# Patient Record
Sex: MALE | Race: WHITE | NOT HISPANIC OR LATINO | Employment: FULL TIME | URBAN - METROPOLITAN AREA
[De-identification: names, ages, dates, MRNs, and addresses within clinical notes are randomized per-mention and may not be internally consistent; named-entity substitution may affect disease eponyms.]

---

## 2018-08-02 ENCOUNTER — OFFICE VISIT (OUTPATIENT)
Dept: INTERNAL MEDICINE | Facility: CLINIC | Age: 53
End: 2018-08-02
Payer: COMMERCIAL

## 2018-08-02 VITALS
SYSTOLIC BLOOD PRESSURE: 122 MMHG | HEIGHT: 70 IN | HEART RATE: 80 BPM | WEIGHT: 227 LBS | OXYGEN SATURATION: 97 % | BODY MASS INDEX: 32.5 KG/M2 | TEMPERATURE: 99 F | DIASTOLIC BLOOD PRESSURE: 70 MMHG | RESPIRATION RATE: 16 BRPM

## 2018-08-02 DIAGNOSIS — Z12.5 SCREENING FOR PROSTATE CANCER: ICD-10-CM

## 2018-08-02 DIAGNOSIS — Z13.1 SCREENING FOR DIABETES MELLITUS: ICD-10-CM

## 2018-08-02 DIAGNOSIS — Z13.6 CARDIOVASCULAR SCREENING; LDL GOAL LESS THAN 130: ICD-10-CM

## 2018-08-02 DIAGNOSIS — Z11.59 NEED FOR HEPATITIS C SCREENING TEST: ICD-10-CM

## 2018-08-02 DIAGNOSIS — L03.114 CELLULITIS OF LEFT UPPER EXTREMITY: Primary | ICD-10-CM

## 2018-08-02 DIAGNOSIS — Z11.4 SCREENING FOR HIV (HUMAN IMMUNODEFICIENCY VIRUS): ICD-10-CM

## 2018-08-02 PROCEDURE — 99213 OFFICE O/P EST LOW 20 MIN: CPT | Performed by: INTERNAL MEDICINE

## 2018-08-02 RX ORDER — CEPHALEXIN 500 MG/1
CAPSULE ORAL
Refills: 0 | COMMUNITY
Start: 2018-07-31 | End: 2020-08-24

## 2018-08-02 NOTE — PROGRESS NOTES
"  SUBJECTIVE:   Hill Lentz is a 53 year old male who presents to clinic today for the following health issues:    Chief Complaint   Patient presents with     Follow Up For     Urgent Care Visit       ED/UC Followup:    Facility:  [facility]  Date of visit: 07/31/2018  Reason for visit: Cellulitis  Current Status: Stable, feeling better       Pt's past medical history, family history, habits, medications and allergies were reviewed with the patient today.  See snap shot for  HCM status. Most recent lab results reviewed with pt. Problem list and histories reviewed & adjusted, as indicated.  Additional history as below:    Seen in outside UC 7/31/18. Note reviewed. Part of note as below:    Physical Exam     BP (!) 137/96 (BP Location: Left Arm, BP Cuff Size: Adult Regular/Long)  Pulse 86  Temp 37.2  C (98.9  F) (Oral)  Resp 16  SpO2 99%    Patient awake alert no acute distress well-nourished well-developed   HEENT: head symmetric nontraumatic eyes PERRLA   Lungs: Clear bilateral no rales or wheezing  Heart: RRR with no murmurs  Skin: erythema measuring 3 cm by 3 cm with induration left upper arm.     No results found for this visit on 07/31/18.    ED Course     Assesment:   1. Cellulitis of left upper extremity     Plan:  Ice and elevate area  Keflex prescribed.  Return to clinic or ER if increased pain, erythema/redness, swelling or fever  follow up with PCP for recheck in 3-5 days.        Back to top of Miscellaneous Notes  Triage Assessment Note - Elenita Pathak LPN - 07/31/2018 1:25 PM CDT  Pt here with possible bug bite under his left arm. He noticed it yesterday morning. It is \"sore\", red, and swollen. Denies itching. He has had a fever, feeling tired, and has a headache.        Was started on Keflex QID for 10 days. BP elevated in UC. Normal today. . Pt states redness area less and less firm.  Pt states that wife sprayed bathroom crawlspace area for spiders the day before onset and " "wonders if stirred up a nest as bedroom right next to bathroom. Was fine the night before onset but doesn't remember particular bite  Had previous chills that has resolved. Minimal fever on vitals today. No headache now after previous achiness.  Weight up 11 pounds in 2 years. Pt states sonjacquie goes to college in 2 weeks and will then be starting new diet with wife      Additional ROS:   Constitutional, HEENT, Cardiovascular, Pulmonary, GI and , Neuro, MSK and Psych review of systems/symptoms are otherwise negative or unchanged from previous, except as noted above.      OBJECTIVE:  /70  Pulse 80  Temp 99  F (37.2  C) (Oral)  Resp 16  Ht 5' 10\" (1.778 m)  Wt 227 lb (103 kg)  SpO2 97%  BMI 32.57 kg/m2   Estimated body mass index is 32.57 kg/(m^2) as calculated from the following:    Height as of this encounter: 5' 10\" (1.778 m).    Weight as of this encounter: 227 lb (103 kg).    Neck: no adenopathy. Thyroid normal to palpation. No bruits  Pulm: Lungs clear to auscultation   CV: Regular rates and rhythm  GI: Soft, obese, nontender, Normal active bowel sounds, No hepatosplenomegaly or masses palpable  Ext: Peripheral pulses intact. No edema.  Neuro: Normal strength and tone, sensory exam grossly normal  Skin: 11x5cm erythema left inner upper extremity near axilla with center red spot 1mm c/w site of previous ? Bite. Mild firmness to cellulitis tissue       Assessment/Plan: (See plan discussion below for further details)  1. Cellulitis of left upper extremity  Improving. Pt will finish 10 day course of Keflex. Will inform MD if not resolved by 1o days or worsens previous    2. Need for hepatitis C screening test  Pt meets criteria for hepatitis C screening based on birth year 1946-65. Discussed pro/cons of Hep C screening/treatment and recs of USPTF. Pt agreeable to screening  - Hepatitis C Screen Reflex to HCV RNA Quant and Genotype; Future    3. Screening for HIV (human immunodeficiency virus)   HIV " screening recommendation per USPSTF guideline discussed with pt. Pt agrees.   - HIV Screening; Future    4. Screening for prostate cancer   PSA ordered for intermittent monitoring after discussion with pt and pt preference. Not doing annual PSA per USPTF recs  - Prostate spec antigen screen; Future    5. Screening for diabetes mellitus  - Comprehensive metabolic panel; Future    6. CARDIOVASCULAR SCREENING; LDL GOAL LESS THAN 130  - Lipid panel reflex to direct LDL Fasting; Future    Plan discussion:  Finish 10 day course of Cephalexin and call clinic if not resolved  By then or worsens prior  Call  [phone]  to schedule a future lab appointment  fasting in  September  Follow up with me a few days after these future labs are drawn to review results and for annual preventative physical exam  Calorie/carbohydrate (sugar, bread, potato, pasta, etc) reduction in diet for weight loss.  Increase color on your plate with fruits and vegetables. Increase  frequency of walking or other aerobic exercise as able (goal is daily)              Valentin Pina MD  Internal Medicine Department  [facility]

## 2018-08-02 NOTE — MR AVS SNAPSHOT
After Visit Summary   8/2/2018    Hill Lentz    MRN: [id number]           Patient Information     Date Of Birth          1965        Visit Information        Provider Department      8/2/2018 8:00 AM Valentin Pina MD [facility]        Today's Diagnoses     Cellulitis of left upper extremity    -  1    Need for hepatitis C screening test        Screening for HIV (human immunodeficiency virus)        Screening for prostate cancer        Screening for diabetes mellitus        CARDIOVASCULAR SCREENING; LDL GOAL LESS THAN 130          Care Instructions    Finish 10 day course of Cephalexin and call clinic if not resolved  By then or worsens prior  Call  [phone]  to schedule a future lab appointment  fasting in  September  Follow up with me a few days after these future labs are drawn to review results and for annual preventative physical exam  Calorie/carbohydrate (sugar, bread, potato, pasta, etc) reduction in diet for weight loss.  Increase color on your plate with fruits and vegetables. Increase  frequency of walking or other aerobic exercise as able (goal is daily)          Follow-ups after your visit        Future tests that were ordered for you today     Open Future Orders        Priority Expected Expires Ordered    Comprehensive metabolic panel Routine 9/1/2018 8/2/2019 8/2/2018    Lipid panel reflex to direct LDL Fasting Routine 9/1/2018 8/2/2019 8/2/2018    Hepatitis C Screen Reflex to HCV RNA Quant and Genotype Routine 9/1/2018 8/2/2019 8/2/2018    HIV Screening Routine 9/1/2018 8/2/2019 8/2/2018    Prostate spec antigen screen Routine 9/1/2018 8/2/2019 8/2/2018            Who to contact     If you have questions or need follow up information about today's clinic visit or your schedule please contact [facility] directly at [phone].  Normal or non-critical lab and imaging results will be communicated to you by MyChart, letter or  "phone within 4 business days after the clinic has received the results. If you do not hear from us within 7 days, please contact the clinic through [employer] or phone. If you have a critical or abnormal lab result, we will notify you by phone as soon as possible.  Submit refill requests through [employer] or call your pharmacy and they will forward the refill request to us. Please allow 3 business days for your refill to be completed.          Additional Information About Your Visit        [employer] Information     [employer] lets you send messages to your doctor, view your test results, renew your prescriptions, schedule appointments and more. To sign up, go to [email or web address] . Click on \"Log in\" on the left side of the screen, which will take you to the Welcome page. Then click on \"Sign up Now\" on the right side of the page.     You will be asked to enter the access code listed below, as well as some personal information. Please follow the directions to create your username and password.     Your access code is: [email or web address]  Expires: 10/31/2018  8:36 AM     Your access code will  in 90 days. If you need help or a new code, please call your [place] clinic or [phone].        Care EveryWhere ID     This is your Care EveryWhere ID. This could be used by other organizations to access your [place] medical records  [id number]        Your Vitals Were     Pulse Temperature Respirations Height Pulse Oximetry BMI (Body Mass Index)    80 99  F (37.2  C) (Oral) 16 5' 10\" (1.778 m) 97% 32.57 kg/m2       Blood Pressure from Last 3 Encounters:   18 122/70   16 124/82   16 115/82    Weight from Last 3 Encounters:   18 227 lb (103 kg)   16 221 lb (100.2 kg)   16 216 lb (98 kg)               Primary Care Provider Office Phone # Fax #    Valentin Pina [phone]       [address] [facility] [address]        Equal Access to Services     RENU LATHAM AH: Lydia zhao " demetris Lovelace, jack luanaadaha, qabrianneta kamdaison ramirez, sheldon vigil shalinilili melomargy lakathylavinia melinda. So [facility] [phone].    ATENCIÓN: Si habla español, tiene a mar disposición servicios gratuitos de asistencia lingüística. Ramses al [phone].    We comply with applicable federal civil rights laws and [place] laws. We do not discriminate on the basis of race, color, national origin, age, disability, sex, sexual orientation, or gender identity.            Thank you!     Thank you for choosing [facility]  for your care. Our goal is always to provide you with excellent care. Hearing back from our patients is one way we can continue to improve our services. Please take a few minutes to complete the written survey that you may receive in the mail after your visit with us. Thank you!             Your Updated Medication List - Protect others around you: Learn how to safely use, store and throw away your medicines at www.disposemymeds.org.          This list is accurate as of 8/2/18  8:36 AM.  Always use your most recent med list.                   Brand Name Dispense Instructions for use Diagnosis    cephALEXin 500 MG capsule    KEFLEX          fluticasone 50 MCG/ACT spray    FLONASE    16 g    Spray 1-2 sprays into both nostrils daily    Right otitis media, unspecified otitis media type

## 2018-08-02 NOTE — PATIENT INSTRUCTIONS
Finish 10 day course of Cephalexin and call clinic if not resolved  By then or worsens prior  Call  [phone]  to schedule a future lab appointment  fasting in  September  Follow up with me a few days after these future labs are drawn to review results and for annual preventative physical exam  Calorie/carbohydrate (sugar, bread, potato, pasta, etc) reduction in diet for weight loss.  Increase color on your plate with fruits and vegetables. Increase  frequency of walking or other aerobic exercise as able (goal is daily)

## 2020-08-24 ENCOUNTER — OFFICE VISIT (OUTPATIENT)
Dept: INTERNAL MEDICINE | Facility: CLINIC | Age: 55
End: 2020-08-24
Payer: COMMERCIAL

## 2020-08-24 VITALS
DIASTOLIC BLOOD PRESSURE: 80 MMHG | OXYGEN SATURATION: 96 % | HEIGHT: 67 IN | HEART RATE: 81 BPM | BODY MASS INDEX: 36.41 KG/M2 | WEIGHT: 232 LBS | SYSTOLIC BLOOD PRESSURE: 120 MMHG | TEMPERATURE: 98.2 F | RESPIRATION RATE: 16 BRPM

## 2020-08-24 DIAGNOSIS — Z72.0 TOBACCO DIPPER: ICD-10-CM

## 2020-08-24 DIAGNOSIS — Z86.0100 HISTORY OF COLONIC POLYPS: ICD-10-CM

## 2020-08-24 DIAGNOSIS — Z00.01 ENCOUNTER FOR ROUTINE ADULT MEDICAL EXAM WITH ABNORMAL FINDINGS: Primary | ICD-10-CM

## 2020-08-24 DIAGNOSIS — E66.01 CLASS 2 SEVERE OBESITY WITH SERIOUS COMORBIDITY AND BODY MASS INDEX (BMI) OF 36.0 TO 36.9 IN ADULT, UNSPECIFIED OBESITY TYPE (H): ICD-10-CM

## 2020-08-24 DIAGNOSIS — E66.812 CLASS 2 SEVERE OBESITY WITH SERIOUS COMORBIDITY AND BODY MASS INDEX (BMI) OF 36.0 TO 36.9 IN ADULT, UNSPECIFIED OBESITY TYPE (H): ICD-10-CM

## 2020-08-24 PROCEDURE — 99396 PREV VISIT EST AGE 40-64: CPT | Performed by: INTERNAL MEDICINE

## 2020-08-24 ASSESSMENT — MIFFLIN-ST. JEOR: SCORE: 1833.04

## 2020-08-24 NOTE — PATIENT INSTRUCTIONS
Call  [phone] or use [employer] to schedule a future lab appointment  fasting in 1-2 weeks.   For fasting labs, please refrain from eating for 8 hours or more.   Drink 2 glasses of water before your lab appointment. It is fine to take your  oral medications on the morning of the lab test as usual  Reduce calorie/carbohydrate (sugar, bread, potato, pasta, rice, alcohol etc)  intake in diet.  Increase color on your plate with fruits and vegetables. Continue  frequency of walking or other aerobic exercise as able (goal is daily)  Check with insurance or speak with your pharmacist re: Shingrix vaccine coverage for shingles prevention.  This is a 2 shot series done 2-6 months apart  I would recommend you receive an influenza (flu) vaccine  this Fall (September or October)   Discontinue chewing tobacco use. Possible nicotine lozenge substitute shortterm

## 2020-08-24 NOTE — PROGRESS NOTES
SUBJECTIVE:   CC: Hill Lentz is an 55 year old male who presents for preventative health visit.     Healthy Habits:     Getting at least 3 servings of Calcium per day:  Yes    Bi-annual eye exam:  Yes    Dental care twice a year:  Yes    Sleep apnea or symptoms of sleep apnea:  Sleep apnea    Diet:  Regular (no restrictions)    Frequency of exercise:  4-5 days/week    Duration of exercise:  45-60 minutes    Taking medications regularly:  Not Applicable    Barriers to taking medications:  Not applicable    Medication side effects:  Not applicable    PHQ-2 Total Score: 0    Additional concerns today:  No      Today's PHQ-2 Score:   PHQ-2 ( 1999 Pfizer) 8/2/2018   Q1: Little interest or pleasure in doing things 0   Q2: Feeling down, depressed or hopeless 0   PHQ-2 Score 0       Abuse: Current or Past(Physical, Sexual or Emotional)- No  Do you feel safe in your environment? Yes      Social History     Tobacco Use     Smoking status: Former Smoker     Packs/day: 0.25     Years: 20.00     Pack years: 5.00     Types: Dip, chew, snus or snuff     Smokeless tobacco: Current User     Types: Chew     Tobacco comment: 1 tin every 2 weeks   Substance Use Topics     Alcohol use: Yes     Alcohol/week: 0.0 standard drinks     Comment: 1-2 drinks twice weekly     If you drink alcohol do you typically have >3 drinks per day or >7 drinks per week? No    Alcohol Use 11/3/2015   Prescreen: >3 drinks/day or >7 drinks/week? The patient does not drink >3 drinks per day nor >7 drinks per week.       Last PSA: No results found for: PSA    Reviewed orders with patient. Reviewed health maintenance and updated orders accordingly - Yes      Reviewed and updated as needed this visit by clinical staff         Reviewed and updated as needed this visit by Provider            Review of Systems  CONSTITUTIONAL: NEGATIVE for fever, chills. Weight up 11 pounds in 4 years  INTEGUMENTARY/SKIN: NEGATIVE for worrisome rashes, moles or lesions  EYES:  "NEGATIVE for vision changes or irritation  ENT: NEGATIVE for ear, mouth and throat problems. Chewing tobacco and reducing use  RESP: NEGATIVE for significant cough or SOB. Using CPAP for BRANDON  CV: NEGATIVE for chest pain, palpitations or peripheral edema  GI: NEGATIVE for nausea, abdominal pain, heartburn, or change in bowel habits (unelss drinks a lot of milk). No sx with mild intake    male: negative for dysuria, hematuria, decreased urinary stream, erectile dysfunction, urethral discharge  MUSCULOSKELETAL: NEGATIVE for significant arthralgias or myalgia  NEURO: NEGATIVE for weakness, dizziness or paresthesias  PSYCHIATRIC: NEGATIVE for changes in mood or affect    OBJECTIVE:   /80   Pulse 81   Temp 98.2  F (36.8  C) (Temporal)   Resp 16   Ht 1.689 m (5' 6.5\")   Wt 105.2 kg (232 lb)   SpO2 96%   BMI 36.88 kg/m      Physical Exam  General appearance -  alert, no distress  Skin - No rashes or lesions. Mld callus medial 1st MTP area of feet bilaterally  Head - normocephalic, atraumatic  Eyes - JENNI, EOMI, fundi exam with nondilated pupils negative.  Ears - External ears normal. Canals clear. TM's normal.  Nose/Sinuses - Nares normal. Septum midline. Mucosa normal. No drainage or sinus tenderness.  Oropharynx - No erythema, no adenopathy, no exudates. NO lesion noted. Narnow airway due to obesuity  Neck - Supple without adenopathy or thyromegaly. No bruits.  Lungs - Clear to auscultation without wheezes/rhonchi.  Heart - Regular rate and rhythm without murmurs, clicks, or gallops.  Nodes - No supraclavicular, axillary, or inguinal adenopathy palpable.  Abdomen - Abdomen obese, soft, non-tender. BS normal. No masses or hepatosplenomegaly palpable. No bruits.  Extremities -No cyanosis, clubbing or edema.    Musculoskeletal - Spine ROM normal. Muscular strength intact.   Peripheral pulses - radial=4/4, femoral=4/4, posterior tibial=4/4, dorsalis pedis=4/4,  Neuro - Gait normal. Reflexes normal and " "symmetric. Sensation grossly WNL.  Genital - Normal-appearing male external genitalia. No scrotal masses or inguinal hernia palpable.   Rectal - Guaic negative stool. Normal tone. Prostate normal in size to palpation. No rectal masses or prostate nodularity palpable    ASSESSMENT/PLAN:   1. Encounter for routine adult medical exam with abnormal findings   See below for healthcare maintenance discussion.  Will have screening labs done in the next week as previously ordered    2. Class 2 severe obesity with serious comorbidity and body mass index (BMI) of 36.0 to 36.9 in adult, unspecified obesity type (H)  Weight up with increased caloric intake. Exercising. See counseling below    3.   Counseled re: oral cancer risks, CAD risk, etc. Pt has been cutting back but not fully motivated to quit at this time. Strongly encounraged cessation. May consider nicotine supplement prn. Possible  Future Chantix    4. History of colonic polyps   Due for  Colonoscopy in early 2011      COUNSELING:   Reviewed preventive health counseling, as reflected in patient instructions    Estimated body mass index is 36.88 kg/m  as calculated from the following:    Height as of this encounter: 1.689 m (5' 6.5\").    Weight as of this encounter: 105.2 kg (232 lb).     Weight management plan: Discussed healthy diet and exercise guidelines     reports that he has quit smoking. His smoking use included dip, chew, snus or snuff. He has a 5.00 pack-year smoking history. His smokeless tobacco use includes chew.  Tobacco Cessation Action Plan: Phone counseling: Place order for Tobacco Cessation Referral    Counseling Resources:  ATP IV Guidelines  Pooled Cohorts Equation Calculator  FRAX Risk Assessment  ICSI Preventive Guidelines  Dietary Guidelines for Americans, 2010  USDA's MyPlate  ASA Prophylaxis  Lung CA Screening      PLAN:  Call  [phone] or use [employer] to schedule a future lab appointment  fasting in 1-2 weeks.   For fasting " labs, please refrain from eating for 8 hours or more.   Drink 2 glasses of water before your lab appointment. It is fine to take your  oral medications on the morning of the lab test as usual  Reduce calorie/carbohydrate (sugar, bread, potato, pasta, rice, alcohol etc)  intake in diet.  Increase color on your plate with fruits and vegetables. Continue  frequency of walking or other aerobic exercise as able (goal is daily)  Check with insurance or speak with your pharmacist re: Shingrix vaccine coverage for shingles prevention.  This is a 2 shot series done 2-6 months apart  I would recommend you receive an influenza (flu) vaccine  this Fall (September or October)   Discontinue chewing tobacco use. Possible nicotine lozenge substitute shortterm    Valentin Pina MD  [facility]

## 2020-09-02 DIAGNOSIS — Z13.6 CARDIOVASCULAR SCREENING; LDL GOAL LESS THAN 130: ICD-10-CM

## 2020-09-02 DIAGNOSIS — Z11.59 NEED FOR HEPATITIS C SCREENING TEST: ICD-10-CM

## 2020-09-02 DIAGNOSIS — Z13.1 SCREENING FOR DIABETES MELLITUS: ICD-10-CM

## 2020-09-02 DIAGNOSIS — Z12.5 SCREENING FOR PROSTATE CANCER: ICD-10-CM

## 2020-09-02 LAB
ALBUMIN SERPL-MCNC: 3.4 G/DL (ref 3.4–5)
ALP SERPL-CCNC: 109 U/L (ref 40–150)
ALT SERPL W P-5'-P-CCNC: 37 U/L (ref 0–70)
ANION GAP SERPL CALCULATED.3IONS-SCNC: 5 MMOL/L (ref 3–14)
AST SERPL W P-5'-P-CCNC: 16 U/L (ref 0–45)
BILIRUB SERPL-MCNC: 0.5 MG/DL (ref 0.2–1.3)
BUN SERPL-MCNC: 14 MG/DL (ref 7–30)
CALCIUM SERPL-MCNC: 8.7 MG/DL (ref 8.5–10.1)
CHLORIDE SERPL-SCNC: 106 MMOL/L (ref 94–109)
CHOLEST SERPL-MCNC: 231 MG/DL
CO2 SERPL-SCNC: 24 MMOL/L (ref 20–32)
CREAT SERPL-MCNC: 1.06 MG/DL (ref 0.66–1.25)
GFR SERPL CREATININE-BSD FRML MDRD: 78 ML/MIN/{1.73_M2}
GLUCOSE SERPL-MCNC: 171 MG/DL (ref 70–99)
HCV AB SERPL QL IA: NONREACTIVE
HDLC SERPL-MCNC: 43 MG/DL
LDLC SERPL CALC-MCNC: 157 MG/DL
NONHDLC SERPL-MCNC: 188 MG/DL
POTASSIUM SERPL-SCNC: 4.1 MMOL/L (ref 3.4–5.3)
PROT SERPL-MCNC: 7.1 G/DL (ref 6.8–8.8)
PSA SERPL-ACNC: 1.21 UG/L (ref 0–4)
SODIUM SERPL-SCNC: 135 MMOL/L (ref 133–144)
TRIGL SERPL-MCNC: 155 MG/DL

## 2020-09-02 PROCEDURE — G0103 PSA SCREENING: HCPCS | Performed by: INTERNAL MEDICINE

## 2020-09-02 PROCEDURE — 80053 COMPREHEN METABOLIC PANEL: CPT | Performed by: INTERNAL MEDICINE

## 2020-09-02 PROCEDURE — 36415 COLL VENOUS BLD VENIPUNCTURE: CPT | Performed by: INTERNAL MEDICINE

## 2020-09-02 PROCEDURE — 80061 LIPID PANEL: CPT | Performed by: INTERNAL MEDICINE

## 2020-09-02 PROCEDURE — 86803 HEPATITIS C AB TEST: CPT | Performed by: INTERNAL MEDICINE

## 2021-01-14 ENCOUNTER — TRANSFERRED RECORDS (OUTPATIENT)
Dept: HEALTH INFORMATION MANAGEMENT | Facility: CLINIC | Age: 56
End: 2021-01-14

## 2021-12-10 ENCOUNTER — OFFICE VISIT (OUTPATIENT)
Dept: INTERNAL MEDICINE | Facility: CLINIC | Age: 56
End: 2021-12-10
Payer: COMMERCIAL

## 2021-12-10 ENCOUNTER — NURSE TRIAGE (OUTPATIENT)
Dept: INTERNAL MEDICINE | Facility: CLINIC | Age: 56
End: 2021-12-10
Payer: COMMERCIAL

## 2021-12-10 VITALS
WEIGHT: 228 LBS | TEMPERATURE: 98.6 F | HEART RATE: 104 BPM | BODY MASS INDEX: 36.25 KG/M2 | SYSTOLIC BLOOD PRESSURE: 146 MMHG | DIASTOLIC BLOOD PRESSURE: 74 MMHG | OXYGEN SATURATION: 95 %

## 2021-12-10 DIAGNOSIS — Z23 HIGH PRIORITY FOR 2019-NCOV VACCINE: ICD-10-CM

## 2021-12-10 DIAGNOSIS — I10 ESSENTIAL HYPERTENSION: Primary | ICD-10-CM

## 2021-12-10 DIAGNOSIS — R73.9 HYPERGLYCEMIA: ICD-10-CM

## 2021-12-10 DIAGNOSIS — E66.01 MORBID OBESITY (H): ICD-10-CM

## 2021-12-10 LAB — HBA1C MFR BLD: 10.1 % (ref 0–5.6)

## 2021-12-10 PROCEDURE — 91300 COVID-19,PF,PFIZER (12+ YRS): CPT | Performed by: INTERNAL MEDICINE

## 2021-12-10 PROCEDURE — 0004A COVID-19,PF,PFIZER (12+ YRS): CPT | Performed by: INTERNAL MEDICINE

## 2021-12-10 PROCEDURE — 83036 HEMOGLOBIN GLYCOSYLATED A1C: CPT | Performed by: INTERNAL MEDICINE

## 2021-12-10 PROCEDURE — 36415 COLL VENOUS BLD VENIPUNCTURE: CPT | Performed by: INTERNAL MEDICINE

## 2021-12-10 PROCEDURE — 80053 COMPREHEN METABOLIC PANEL: CPT | Performed by: INTERNAL MEDICINE

## 2021-12-10 PROCEDURE — 99214 OFFICE O/P EST MOD 30 MIN: CPT | Performed by: INTERNAL MEDICINE

## 2021-12-10 RX ORDER — LISINOPRIL 20 MG/1
20 TABLET ORAL DAILY
Qty: 90 TABLET | Refills: 1 | Status: SHIPPED | OUTPATIENT
Start: 2021-12-10 | End: 2021-12-15

## 2021-12-10 RX ORDER — LISINOPRIL 20 MG/1
20 TABLET ORAL DAILY
Qty: 90 TABLET | Refills: 1 | Status: SHIPPED | OUTPATIENT
Start: 2021-12-10 | End: 2021-12-10

## 2021-12-10 NOTE — TELEPHONE ENCOUNTER
"appt scheduled 12/10/21 1130    Leanne Mike RN      Reason for Disposition    Patient wants to be seen    Additional Information    Negative: Sounds like a life-threatening emergency to the triager    Negative: Pregnant > 20 weeks or postpartum (< 6 weeks after delivery) and new hand or face swelling    Negative: Pregnant > 20 weeks and BP > 140/90    Negative: Systolic BP >= 160 OR Diastolic >= 100, and any cardiac or neurologic symptoms (e.g., chest pain, difficulty breathing, unsteady gait, blurred vision)    Negative: Patient sounds very sick or weak to the triager    Negative: BP Systolic BP >= 140 OR Diastolic >= 90 and postpartum (from 0 to 6 weeks after delivery)    Negative: Systolic BP >= 180 OR Diastolic >= 110, and missed most recent dose of blood pressure medication    Negative: Systolic BP >= 180 OR Diastolic >= 110    Answer Assessment - Initial Assessment Questions  1. BLOOD PRESSURE: \"What is the blood pressure?\" \"Did you take at least two measurements 5 minutes apart?\"      No machine to check bp today. Reading yesterday at the dental office was 155/102 came down 150's/90's  2. ONSET: \"When did you take your blood pressure?\"      12/9/21  3. HOW: \"How did you obtain the blood pressure?\" (e.g., visiting nurse, automatic home BP monitor)      Automatic cuff at the dental office   4. HISTORY: \"Do you have a history of high blood pressure?\"      No   5. MEDICATIONS: \"Are you taking any medications for blood pressure?\" \"Have you missed any doses recently?\"      No   6. OTHER SYMPTOMS: \"Do you have any symptoms?\" (e.g., headache, chest pain, blurred vision, difficulty breathing, weakness)      No  7. PREGNANCY: \"Is there any chance you are pregnant?\" \"When was your last menstrual period?\"      N/a    Protocols used: HIGH BLOOD PRESSURE-A-OH      "

## 2021-12-10 NOTE — LETTER
[facility]  [address] [address]  Phone: [phone]   12/13/2021      Hill Lentz  [address]  [facility] [address]        Dear Mr. Hill Lentz:    I am writing to inform you of the results of the laboratory tests you had done recently. I did try to call you to discuss these but unfortunately was unable to get ahold of you. Your electrolytes, kidneys, and liver look good. Here are the results of your recent labs. Unfortunately, the results show you have developed diabetes. Hemoglobin A1c is a lab is a marker of someone's blood sugars over the last 3 months. An A1c above 6.5% is considered in the diabetic range. Yours is 10.1%, which is quite high and I do recommend initiating medication to treat your diabetes. Please make a follow-up appointment with me at your earliest availability/convenience for us to discuss next steps.    Thank you for allowing me to participate in your care. If you have any further questions or problems, please contact me via our nurse line at [phone]. An even easier way to get ahold of myself or my team is through [employer], which you can sign up for at [email or web address]. MyChart is not only a great way to communicate with us, but also allows you to see your full results.      Sincerely,        Gumaro Urena MD, MPH  Department of Internal Medicine  [facility]

## 2021-12-10 NOTE — PATIENT INSTRUCTIONS
- I will send you a message on [employer] when I am able to look at the results of your tests from today    Today we discussed your hypertension (high blood pressure). Four important things to remember about your hypertension:    1) Take all medications as prescribed! Today we discussed your blood pressure medications and decided to start a new medication called lisinopril. Let me know if you develop a dry cough on this medication.    2) Lose weight! Losing weight is the best thing you can do to lower your blood pressure. Studies have found that for every 2 pounds you lose, your blood pressure will go down by 1 point. Ex: if you lose 10 pounds, your blood pressure should go down by 5 points. That's better than any medication, plus it will impact your health in a number of other positive ways. This may be a good time to make a weight loss goal.    3) Lower your sodium intake! Eating too much salt causes your body to hold onto extra water, which makes your blood pressure higher. Ditching the salt shaker is a good thing, but most of our sodium intake actually comes from pre-packaged foods. Read labels on cans and food packages. The labels tell you how much sodium is in each serving. Make sure that you look at the serving size. If you eat more than the serving size, you have eaten more sodium. Decreasing your sodium intake by more than 1,000mg per day can lower your blood pressure by up to 5 points and can be as effective as starting a blood pressure medication.    4) Check your blood pressure at home! You can buy a home monitor in a drugstore, supermarket pharmacy, or other large store. Not all automated blood pressure machines are created equal. You can find a list of validated blood pressure cuffs (meaning they have been confirmed to give accurate readings) by going to www.validatebp.org. That website does not sell blood pressure cuffs, but rather it lists the exact models that have been validated to be accurate. Wrist  blood pressure cuffs do not provide reliable comparisons to upper arm (brachial) cuffs. Be sure the arm cuff is the right size for your arm. Ask someone to measure around your upper arm. If your upper arm is more than 13 inches around, buy a monitor with a large cuff. To get a correct measurement, the cuff needs to be the right size.    It is important to measure your blood pressure periodically at home in between office visits. The readings you obtain during these blood pressure checks are often more valuable than the readings we obtain in clinic. However, it is even more important to check your blood pressure CORRECTLY at home. Follow these tips.      Check your blood pressure in the early morning (before you eat, drink, or take any medicines) and at one other random time of day. The random time of day does not need to be consistent from day to day.    Put the cuff on your arm. Remove clothes that get in the way of the cuff. Don t roll up your sleeve in a way that s tight around your arm. The cord should go toward your hand. Line it up with the middle of your forearm. The Velcro should attach easily on the cuff. If it doesn t reach, you may need a bigger cuff.    Wait 30 minutes if you have just eaten a lot, had a drink with caffeine or alcohol, used tobacco products, or exercised. Use the restroom if you need to. (Needing to go can raise your BP.)    Rest both feet flat on the floor with your back supported. Rest your arm at heart level on a table or the arm of a chair.    Sit quietly for 5 minutes or more before taking your blood pressure. Avoid talking while your blood pressure is being measured.    Start the monitor. Press the button or squeeze the ball to measure your blood pressure. Write down the time, the measurement, and your pulse.    Wait 2 minutes.    Repeat 2 more times.    Take the average of the readings. That's your blood pressure.    Lastly, bring your home monitor into the office for us to  validate! Compare your blood pressure monitor to the standardized method we use. It's a good idea to do this once per machine or if your machine starts giving you odd readings (suddenly much higher or lower than you're used to).

## 2021-12-10 NOTE — PROGRESS NOTES
Assessment & Plan     Essential hypertension  BP consistently >140/90. Discussed new guidelines that aim for <130/80. Patient open to starting medication today. Discussed lifestyle interventions such as weight loss and reducing sodium intake. Will initiate lisinopril. Counseled on risk of dry cough, angioedema, kidney dysfunction, hyperkalemia. Will check labs today. Encouraged to continue checking BP at home and to let me know via MyChart or f/u visit what those readings are in case we need to further titrate medication to obtain optimal BP control.   - Comprehensive metabolic panel; Future  - lisinopril (ZESTRIL) 20 MG tablet; Take 1 tablet (20 mg) by mouth daily    Hyperglycemia  Seen on past metabolic panel. Will better characterize with A1c.  - Hemoglobin A1c; Future    Morbid obesity (H)  Known issue that I take into account for their medical decisions, no current exacerbations or new concerns. Counseled him that weight loss would help with HTN.    High priority for 2019-nCoV vaccine  Third dose booster.  - COVID-19,PF,PFIZER (12+ Yrs PURPLE LABEL)    F/u with regular PCP in next 2-3 months for BP check    Gumaro Urena MD  [facility]    Flip Alejandre is a 56 year old who presents today to discuss HTN. He recently went to the dentist and the eye doctor and at both appointments he was told his blood pressure was high (in the 150s/90s). He has no past history of high BP. Open to medication. Hoping to work on diet as well.    Review of Systems   Constitutional, HEENT, cardiovascular, pulmonary, gi systems are negative, except as otherwise noted.      Objective    BP (!) 146/74   Pulse 104   Temp 98.6  F (37  C) (Temporal)   Wt 103.4 kg (228 lb)   SpO2 95%   BMI 36.25 kg/m    Body mass index is 36.25 kg/m .     Physical Exam   GENERAL: alert and in no distress.  EYES: conjunctivae/corneas clear. EOMs grossly intact  HENT: Facies symmetric.  RESP: CTAB, no w/r/r.  CV: RRR,  no m/r/g.  GI: NT, ND  MSK: No edema. Moves all four extremities freely.  SKIN: No significant ulcers, lesions, or rashes on the visualized portions of the skin  NEURO: Alert. Oriented.

## 2021-12-12 ENCOUNTER — TELEPHONE (OUTPATIENT)
Dept: OTHER | Facility: CLINIC | Age: 56
End: 2021-12-12
Payer: COMMERCIAL

## 2021-12-12 ENCOUNTER — HOSPITAL ENCOUNTER (EMERGENCY)
Facility: CLINIC | Age: 56
Discharge: HOME | End: 2021-12-12
Attending: EMERGENCY MEDICINE | Admitting: EMERGENCY MEDICINE
Payer: COMMERCIAL

## 2021-12-12 VITALS
TEMPERATURE: 98.3 F | SYSTOLIC BLOOD PRESSURE: 157 MMHG | RESPIRATION RATE: 18 BRPM | HEART RATE: 85 BPM | DIASTOLIC BLOOD PRESSURE: 92 MMHG | OXYGEN SATURATION: 97 %

## 2021-12-12 DIAGNOSIS — R73.9 HYPERGLYCEMIA: ICD-10-CM

## 2021-12-12 DIAGNOSIS — I10 HYPERTENSION, UNSPECIFIED TYPE: ICD-10-CM

## 2021-12-12 LAB
ALBUMIN SERPL-MCNC: 3.3 G/DL (ref 3.4–5)
ALP SERPL-CCNC: 142 U/L (ref 40–150)
ALT SERPL W P-5'-P-CCNC: 35 U/L (ref 0–70)
ANION GAP SERPL CALCULATED.3IONS-SCNC: 5 MMOL/L (ref 3–14)
AST SERPL W P-5'-P-CCNC: 9 U/L (ref 0–45)
ATRIAL RATE - MUSE: 84 BPM
BILIRUB SERPL-MCNC: 0.3 MG/DL (ref 0.2–1.3)
BUN SERPL-MCNC: 16 MG/DL (ref 7–30)
CALCIUM SERPL-MCNC: 8.9 MG/DL (ref 8.5–10.1)
CHLORIDE BLD-SCNC: 104 MMOL/L (ref 94–109)
CO2 SERPL-SCNC: 26 MMOL/L (ref 20–32)
CREAT SERPL-MCNC: 0.96 MG/DL (ref 0.66–1.25)
DIASTOLIC BLOOD PRESSURE - MUSE: NORMAL MMHG
GFR SERPL CREATININE-BSD FRML MDRD: 87 ML/MIN/1.73M2
GLUCOSE BLD-MCNC: 412 MG/DL (ref 70–99)
GLUCOSE BLDC GLUCOMTR-MCNC: 247 MG/DL (ref 70–99)
INTERPRETATION ECG - MUSE: NORMAL
P AXIS - MUSE: 6 DEGREES
POTASSIUM BLD-SCNC: 4.4 MMOL/L (ref 3.4–5.3)
PR INTERVAL - MUSE: 140 MS
PROT SERPL-MCNC: 6.9 G/DL (ref 6.8–8.8)
QRS DURATION - MUSE: 94 MS
QT - MUSE: 386 MS
QTC - MUSE: 456 MS
R AXIS - MUSE: -35 DEGREES
SODIUM SERPL-SCNC: 135 MMOL/L (ref 133–144)
SYSTOLIC BLOOD PRESSURE - MUSE: NORMAL MMHG
T AXIS - MUSE: 20 DEGREES
VENTRICULAR RATE- MUSE: 84 BPM

## 2021-12-12 PROCEDURE — 99284 EMERGENCY DEPT VISIT MOD MDM: CPT

## 2021-12-12 PROCEDURE — 93005 ELECTROCARDIOGRAM TRACING: CPT

## 2021-12-12 ASSESSMENT — ENCOUNTER SYMPTOMS
FREQUENCY: 0
ABDOMINAL PAIN: 0
SHORTNESS OF BREATH: 0
POLYDIPSIA: 0

## 2021-12-12 NOTE — PROGRESS NOTES
I was contacted on call for Dr. Pina. I was informed by the  at [facility] that the patient has an elevated glucose at 412. Patient is not previously known to be diabetic. His hemoglobin A-1 C is 10.1. I attempted to call the patient s home telephone number listed in epic. I received a recording that it was disconnected. I additionally called the patient s cell phone. I got voicemail. I left a voicemail for the patient informing him  that his glucose is 412, that he has a new diagnosis of 2 diabetes, and that he should seek immediate treatment in the ED. If he had further questions, he was instructed to call me back.

## 2021-12-12 NOTE — TELEPHONE ENCOUNTER
Patient called and told of provider advise.    Linda Rutherford RN  [facility] Nurse Advisor  2:32 PM  12/12/2021        Marco Antonio Chamberlain MD         12/12/21 2:01 PM  Note  I was contacted on call for Dr. Pina. I was informed by the  at [facility] that the patient has an elevated glucose at 412. Patient is not previously known to be diabetic. His hemoglobin A-1 C is 10.1. I attempted to call the patient s home telephone number listed in epic. I received a recording that it was disconnected. I additionally called the patient s cell phone. I got voicemail. I left a voicemail for the patient informing him  that his glucose is 412, that he has a new diagnosis of 2 diabetes, and that he should seek immediate treatment in the ED. If he had further questions, he was instructed to call me back.

## 2021-12-12 NOTE — ED TRIAGE NOTES
Recently saw eye doctor and pcp who said patient's BP was elevated, had appt Thursday 12/9 with MD and got call today stating blood sugar was over 400, advised patient to come to ED

## 2021-12-13 NOTE — ED PROVIDER NOTES
History   Chief Complaint:  Hypertension and Hyperglycemia       HPI   Hill Lentz is a 56 year old male with a history of hyperlipidemia who presents with hypertension and hyperglycemia. The patient was seen at his primary care 3 days ago with pending lab results. They called the patient today after finding out that his blood sugar was around 400 and referred him to the ED for evaluation. This was not a fasting blood sugar test. He mentions that a couple months ago began to feel different with intermittent fluttering in his chest. He had an eye appointment a month ago and they found his blood pressure to be high. He then had a dentist appointment last week, and they found his blood pressure to be 155/102.  This is what prompted his primary care visit the next day.  He was started on lisinopril that day.  He states that he is feeling fine today with no pain or symptoms.  He denies any chest pain, shortness of breath, abdominal pain, headaches, urinary frequency, or polydipsia.    Review of Systems   Respiratory: Negative for shortness of breath.    Cardiovascular: Negative for chest pain.   Gastrointestinal: Negative for abdominal pain.   Endocrine: Negative for polydipsia.   Genitourinary: Negative for frequency.   All other systems reviewed and are negative.      Allergies:  The patient has no known allergies.     Medications:  Lisinopril     Past Medical History:     Colon polyp   Diverticulitis   Erectile dysfunction   Hyperlipidemia   BRANDON   Obesity     Past Surgical History:    Tonsillectomy     Family History:    Diverticulitis     Social History:  Patient presents alone     Physical Exam     Patient Vitals for the past 24 hrs:   BP Temp Temp src Pulse Resp SpO2   12/12/21 1458 (!) 157/92 98.3  F (36.8  C) Temporal 85 18 97 %       Physical Exam  Nursing note and vitals reviewed.  Constitutional:  Oriented to person, place, and time. Cooperative.   HENT:   Nose:    Nose normal.   Mouth/Throat:    Facemask in place.   Eyes:    Conjunctivae normal and EOM are normal.      Pupils are equal, round, and reactive to light.   Neck:    Trachea normal.   Cardiovascular:  Normal rate, regular rhythm, normal heart sounds and normal pulses. No murmur heard.  Pulmonary/Chest:  Effort normal and breath sounds normal.   Abdominal:   Soft. Normal appearance and bowel sounds are normal.      There is no tenderness.      There is no rebound and no CVA tenderness.   Musculoskeletal:  Extremities atraumatic x 4.   Lymphadenopathy:  No cervical adenopathy.   Neurological:   Alert and oriented to person, place, and time. Normal strength.      No cranial nerve deficit or sensory deficit. GCS eye subscore is 4. GCS verbal subscore is 5. GCS motor subscore is 6.   Skin:    Skin is intact. No rash noted.   Psychiatric:   Normal mood and affect.    Emergency Department Course   ECG  ECG obtained at 1458, ECG read at 1905  Normal sinus rhythm   Left axis deviation   Pulmonary disease pattern   Incomplete right bundle branch block    Rate 84 bpm. TN interval 140 ms. QRS duration 94 ms. QT/QTc 386/456 ms. P-R-T axes 6 -35 20.     Laboratory:  Labs Ordered and Resulted from Time of ED Arrival to Time of ED Departure   GLUCOSE BY METER - Abnormal       Result Value    GLUCOSE BY METER POCT 247 (*)         Emergency Department Course:  Reviewed:  I reviewed nursing notes, vitals, past medical history and Care Everywhere    Assessments:  1907 I obtained history and examined the patient as noted above.   1919 I rechecked the patient and explained findings.     Consults:  1916 I spoke with  Dr. Chamberlain about the patient's findings    Disposition:  The patient was discharged to home.     Impression & Plan   Medical Decision Making:  This is a 56-year-old male who was instructed to come in due to a blood sugar reading the other day of about 400.  We obtained a fingerstick here, and it came back at 247.  He feels fine and he has no worrisome  symptoms or exam findings.  He had an EKG obtained here as well, which does not show anything acute, although we do not have an old one to compare.  His blood pressure is high here but not at a dangerous level.  He also had blood work obtained a few days ago, which I am able to look at.  I subsequently spoke with Dr. Chamberlain, who is on-call for the patient's primary physician Dr. Pina.  He recommended starting the patient on Metformin 1000 mg twice a day, and I am providing the prescription to the patient.  He is to follow-up with Dr. Pina as soon as possible this week as well.    Diagnosis:    ICD-10-CM    1. Hyperglycemia  R73.9    2. Hypertension, unspecified type  I10        Discharge Medications:  New Prescriptions    METFORMIN (GLUCOPHAGE) 500 MG TABLET    Take 2 tablets (1,000 mg) by mouth 2 times daily (with meals)       Scribe Disclosure:  I, Pauly Wallace, am serving as a scribe at 6:58 PM on 12/12/2021 to document services personally performed by William Oswald MD based on my observations and the provider's statements to me.          William Oswald MD  12/12/21 1925

## 2021-12-15 ENCOUNTER — OFFICE VISIT (OUTPATIENT)
Dept: INTERNAL MEDICINE | Facility: CLINIC | Age: 56
End: 2021-12-15
Payer: COMMERCIAL

## 2021-12-15 VITALS
WEIGHT: 227.6 LBS | TEMPERATURE: 98.4 F | HEART RATE: 95 BPM | OXYGEN SATURATION: 99 % | DIASTOLIC BLOOD PRESSURE: 80 MMHG | SYSTOLIC BLOOD PRESSURE: 124 MMHG | BODY MASS INDEX: 36.19 KG/M2

## 2021-12-15 DIAGNOSIS — E78.5 HYPERLIPIDEMIA LDL GOAL <100: ICD-10-CM

## 2021-12-15 DIAGNOSIS — I10 ESSENTIAL HYPERTENSION: ICD-10-CM

## 2021-12-15 DIAGNOSIS — E11.65 TYPE 2 DIABETES MELLITUS WITH HYPERGLYCEMIA, WITHOUT LONG-TERM CURRENT USE OF INSULIN (H): Primary | ICD-10-CM

## 2021-12-15 PROCEDURE — 82043 UR ALBUMIN QUANTITATIVE: CPT | Performed by: INTERNAL MEDICINE

## 2021-12-15 PROCEDURE — 99207 PR FOOT EXAM NO CHARGE: CPT | Performed by: INTERNAL MEDICINE

## 2021-12-15 PROCEDURE — 80061 LIPID PANEL: CPT | Performed by: INTERNAL MEDICINE

## 2021-12-15 PROCEDURE — 36415 COLL VENOUS BLD VENIPUNCTURE: CPT | Performed by: INTERNAL MEDICINE

## 2021-12-15 PROCEDURE — 99215 OFFICE O/P EST HI 40 MIN: CPT | Performed by: INTERNAL MEDICINE

## 2021-12-15 RX ORDER — ATORVASTATIN CALCIUM 40 MG/1
40 TABLET, FILM COATED ORAL DAILY
Qty: 90 TABLET | Refills: 3 | Status: SHIPPED | OUTPATIENT
Start: 2021-12-15 | End: 2022-12-05

## 2021-12-15 RX ORDER — LISINOPRIL 20 MG/1
20 TABLET ORAL DAILY
Qty: 90 TABLET | Refills: 1 | Status: SHIPPED | OUTPATIENT
Start: 2021-12-15 | End: 2022-03-21

## 2021-12-15 NOTE — PROGRESS NOTES
Assessment & Plan     Type 2 diabetes mellitus with hyperglycemia, without long-term current use of insulin (H)  New diagnosis. Reviewed diabetes in detail today (what it is, why we care about it, how we treat it, etc). A1c recently 10.1%. Metformin started and he's mostly tolerating it. Feeling better. UPC today. On lisinopril already. Will start statin today as below. Recent eye exam though did not have dilation. Encouraged f/u on that. Diabetes educator referral placed and he was quite interested in that. Encouraged dietary changes (discussed basic of that today) and weight loss. Foot exam WNL today. F/u in 3 months for re-check and A1c.  - FOOT EXAM  NO CHARGE [67178.114]  - metFORMIN (GLUCOPHAGE) 1000 MG tablet; Take 1 tablet (1,000 mg) by mouth 2 times daily (with meals)  - AMB Adult Diabetes Educator Referral; Future  - Albumin Random Urine Quantitative with Creat Ratio    Hyperlipidemia LDL goal <100  Seen on 2020 labs. Will repeat today. Discussed recommendations to initiate statin therapy in diabetics and he was agreeable. Reviewed common possible side effects.  - atorvastatin (LIPITOR) 40 MG tablet; Take 1 tablet (40 mg) by mouth daily  - Lipid panel reflex to direct LDL Non-fasting    Essential hypertension  BP at goal today on lisinopril. Continue. Encouraged weight loss.  - lisinopril (ZESTRIL) 20 MG tablet; Take 1 tablet (20 mg) by mouth daily    Return in about 3 months (around 3/15/2022) for Physical Exam, Diabetes Follow-Up.    Gumaro Urena MD  [facility]    I spent between 41-54 minutes on the date of the encounter doing chart review, history and exam, documentation and further activities as noted above    Flip Alejandre is a 56 year old who presents for follow-up on his new diagnosis of diabetes. This would found on the routine blood work done at our first visit last week. He was directed to go to the ER by Dr. Chamberlain who was paged regarding a critical lab  value. The ER followed Dr. Chamberlain's recommendation and started him on metformin. He has started the metformin and tolerating it well. Minimal GI side effects. BP at goal today. He has a number of questions about diabetes which we discussed today.    Review of Systems   Constitutional, MSK, neuro, HEENT, gi systems are negative, except as otherwise noted.      Objective    /80   Pulse 95   Temp 98.4  F (36.9  C) (Tympanic)   Wt 103.2 kg (227 lb 9.6 oz)   SpO2 99%   BMI 36.19 kg/m    Body mass index is 36.19 kg/m .     Physical Exam   GENERAL: alert and in no distress.  EYES: conjunctivae/corneas clear. EOMs grossly intact  HENT: Facies symmetric.  RESP: No iWOB.  CV: RRR to DP.  MSK: Moves all four extremities freely  FOOT EXAM: No ulcers/sores. DP pulses 2+ bilaterally. Sensation to monofilament intact.  SKIN: No significant ulcers, lesions, or rashes on the visualized portions of the skin  NEURO: Alert. Oriented.

## 2021-12-15 NOTE — LETTER
[facility]  [address] [address]  Phone: [phone]   12/18/2021      Hill Lentz  [address]  [facility] [address]        Dear Mr. Hill Lentz:    I am writing to inform you of the results of the laboratory tests you had done recently. You do not have protein in your urine which is great news. Let's repeat this test yearly. Your cholesterol is elevated and I'm glad we decided to start the atorvastatin. Let's recheck that in 3 months to ensure it's working.    Thank you for allowing me to participate in your care. If you have any further questions or problems, please contact me via our nurse line at [phone]. An even easier way to get ahold of myself or my team is through [employer], which you can sign up for at [email or web address]. MyChart is not only a great way to communicate with us, but also allows you to see your full results.      Sincerely,        Gumaro Urena MD, MPH  Department of Internal Medicine  [facility]

## 2021-12-15 NOTE — PATIENT INSTRUCTIONS
Work on weight loss! Goal is to lose 1-2 pounds per week. Portion control and cutting out simple carbohydrates (like added sugars, pasta, white bread, rice, etc).    - Yearly eye exam  - Yearly urine test to look for protein in your urine  - Yearly foot exam (like we did today)

## 2021-12-16 LAB
CREAT UR-MCNC: 194 MG/DL
MICROALBUMIN UR-MCNC: 13 MG/L
MICROALBUMIN/CREAT UR: 6.7 MG/G CR (ref 0–17)

## 2021-12-18 LAB
CHOLEST SERPL-MCNC: 245 MG/DL
FASTING STATUS PATIENT QL REPORTED: NO
HDLC SERPL-MCNC: 43 MG/DL
LDLC SERPL CALC-MCNC: 156 MG/DL
NONHDLC SERPL-MCNC: 202 MG/DL
TRIGL SERPL-MCNC: 229 MG/DL

## 2022-01-18 ENCOUNTER — ALLIED HEALTH/NURSE VISIT (OUTPATIENT)
Dept: EDUCATION SERVICES | Facility: CLINIC | Age: 57
End: 2022-01-18
Payer: COMMERCIAL

## 2022-01-18 VITALS — BODY MASS INDEX: 34.96 KG/M2 | WEIGHT: 219.9 LBS

## 2022-01-18 DIAGNOSIS — E11.65 TYPE 2 DIABETES MELLITUS WITH HYPERGLYCEMIA, WITHOUT LONG-TERM CURRENT USE OF INSULIN (H): Primary | ICD-10-CM

## 2022-01-18 PROCEDURE — G0108 DIAB MANAGE TRN  PER INDIV: HCPCS | Performed by: DIETITIAN, REGISTERED

## 2022-01-18 RX ORDER — BLOOD SUGAR DIAGNOSTIC
STRIP MISCELLANEOUS
Qty: 50 STRIP | Refills: 11 | Status: SHIPPED | OUTPATIENT
Start: 2022-01-18 | End: 2023-11-04

## 2022-01-18 RX ORDER — METFORMIN HCL 500 MG
2000 TABLET, EXTENDED RELEASE 24 HR ORAL
Qty: 120 TABLET | Refills: 11 | Status: SHIPPED | OUTPATIENT
Start: 2022-01-18 | End: 2022-09-13

## 2022-01-18 RX ORDER — LANCETS
EACH MISCELLANEOUS
Qty: 100 EACH | Refills: 3 | Status: SHIPPED | OUTPATIENT
Start: 2022-01-18 | End: 2023-11-04

## 2022-01-18 NOTE — Clinical Note
FYI, off to a great start with diabetes self care!  Nichole Hector RD, LD, [facility]

## 2022-01-18 NOTE — LETTER
1/18/2022         RE: Hill Lentz  [address]  [facility] [id number]        Dear Colleague,    Thank you for referring your patient, Hill Lentz, to the [facility]. Please see a copy of my visit note below.    Diabetes Self-Management Education & Support    Presents for: Initial Assessment for new diagnosis    Type of Visit: In Person    How would patient like to obtain AVS? declined    ASSESSMENT:  Hill is accompanied by wife, Cher.   He has already made positive changes: eating less, taking med, and lost weight (~9#).  Remote family history of diabetes in 1 relative.   He says the diagnosis has been a bit of a boost to think about making changes    Patient's most recent   Lab Results   Component Value Date    A1C 10.1 12/10/2021    is not meeting goal of <7.0    Diabetes knowledge and skills assessment:   Patient is knowledgeable in diabetes management concepts related to: needs comprehensive ed for new dx    Continue education with the following diabetes management concepts: Healthy Eating, Being Active, Monitoring, Taking Medication, Problem Solving, Reducing Risks and Healthy Coping    Based on learning assessment above, most appropriate setting for further diabetes education would be: Individual setting.    INTERVENTIONS:    Education provided today on:  AADE Self-Care Behaviors:  Diabetes Pathophysiology    Healthy Eating: carbohydrate counting, portion control, plate planning method, label reading and rec 30-60 g carb/meal, reviewed V-8 low sodium drink as possible morning alternative as well as the zero mignon drinks he is using    Being Active: relationship to blood glucose    Monitoring: purpose, proper technique, log and interpret results, individual blood glucose targets and frequency of monitoring    Taking Medication: action of prescribed medication, side effects of prescribed medications, when to take medications and will change to XR with some  "GI upset    Healthy Coping: recognize feelings about diagnosis, benefits of making appropriate lifestyle changes and utilize support systems    Opportunities for ongoing education and support in diabetes-self management were discussed. Pt verbalized understanding of concepts discussed and recommendations provided today.       Education Materials Provided:  BG Log Sheet, Carbohydrate Counting, My Plate Planner and BG log book      PLAN  1) continue with smaller food portions, aim for 30-60 g carb/meal  2) choose high fiber foods where possible  3) check BG once daily at varying times    Topics to cover at upcoming visits: Monitoring, Problem Solving and Reducing Risks  Follow-up: Feb    See Goals Section for co-developed, patient-stated behavior change goals.      SUBJECTIVE / OBJECTIVE:  Presents for: Initial Assessment for new diagnosis  Accompanied by: Spouse (Cher)  Diabetes education in the past 24mo: No  Focus of Visit: Assistance w/ making life changes  Diabetes type: Type 2  Date of diagnosis: December 2021  How confident are you filling out medical forms by yourself:: Extremely  Diabetes management related comments/concerns: what are the foods  Other concerns:: None  Cultural Influences/Ethnic Background:  American      Diabetes Symptoms & Complications:  Visual change: No (but eye doctor identified high BP and then DB was identified at HTN check)  Other: Sometimes (heart flutters, some light headedness)  Weight trend: Decreasing (had been increasing over time, but now has lost 5#)       Patient Problem List and Family Medical History reviewed for relevant medical history, current medical status, and diabetes risk factors.    Vitals:  There were no vitals taken for this visit.  Estimated body mass index is 36.19 kg/m  as calculated from the following:    Height as of 8/24/20: 1.689 m (5' 6.5\").    Weight as of 12/15/21: 103.2 kg (227 lb 9.6 oz).   Last 3 BP:   BP Readings from Last 3 Encounters:   12/15/21 " 124/80   12/12/21 (!) 157/92   12/10/21 (!) 146/74       History   Smoking Status     Former Smoker     Packs/day: 0.25     Years: 20.00     Types: Dip, chew, snus or snuff   Smokeless Tobacco     Current User     Types: Chew     Comment: 1 tin every 2 weeks       Labs:  Lab Results   Component Value Date    A1C 10.1 12/10/2021     Lab Results   Component Value Date     12/12/2021     12/10/2021     09/02/2020     Lab Results   Component Value Date     12/15/2021     09/02/2020     HDL Cholesterol   Date Value Ref Range Status   09/02/2020 43 >39 mg/dL Final     Direct Measure HDL   Date Value Ref Range Status   12/15/2021 43 >=40 mg/dL Final   ]  GFR Estimate   Date Value Ref Range Status   12/10/2021 87 >60 mL/min/1.73m2 Final     Comment:     As of July 11, 2021, eGFR is calculated by the CKD-EPI creatinine equation, without race adjustment. eGFR can be influenced by muscle mass, exercise, and diet. The reported eGFR is an estimation only and is only applicable if the renal function is stable.   09/02/2020 78 >60 mL/min/[1.73_m2] Final     Comment:     Non  GFR Calc  Starting 12/18/2018, serum creatinine based estimated GFR (eGFR) will be   calculated using the Chronic Kidney Disease Epidemiology Collaboration   (CKD-EPI) equation.       GFR Estimate If Black   Date Value Ref Range Status   09/02/2020 >90 >60 mL/min/[1.73_m2] Final     Comment:      GFR Calc  Starting 12/18/2018, serum creatinine based estimated GFR (eGFR) will be   calculated using the Chronic Kidney Disease Epidemiology Collaboration   (CKD-EPI) equation.       Lab Results   Component Value Date    CR 0.96 12/10/2021    CR 1.06 09/02/2020     No results found for: MICROALBUMIN    Healthy Eating:  Healthy Eating Assessed Today: Yes (sweet craving has decreased)  Meal planning/habits: Smaller portions (changing products to less sugar and salt too)  ~ they have changed to more  "whole grains, higher fiber items, increasing produce intake  Meals include: Breakfast,Lunch,Dinner  Beverages: Water,Coffee,Sports drinks (Arnold Ortega light; gatorade zero, 7-up zero)  Has patient met with a dietitian in the past?: No        Being Active:  Being Active Assessed Today: Yes  Exercise:: Yes (daughter lives nearby and drops the dog off for the day- he walks the dog)  Days per week of moderate to strenuous exercise (like a brisk walk): 3 (3-4 depends on weather)  On average, minutes per day of exercise at this level: 30 (they have a treadmill)  How intense was your typical exercise? : Moderate (like brisk walking)  Exercise Minutes per Week: 90  Barrier to exercise: None    Monitoring:  Monitoring Assessed Today: Yes  Did patient bring glucose meter to appointment? : No    Glucose data:  None available, ordering meter today    Taking Medications:  Diabetes Medication(s)     Biguanides       metFORMIN (GLUCOPHAGE) 1000 MG tablet    Take 1 tablet (1,000 mg) by mouth 2 times daily (with meals)        Taking Medication Assessed Today: Yes  Current Treatments: Oral Medication (taken by mouth)  Problems taking diabetes medications regularly?: No  Diabetes medication side effects?: Yes (some ongoing GI \"rumble\" and occasional diarrhea) will change to XR    Problem Solving:  Problem Solving Assessed Today: No    Reducing Risks:  Reducing Risks Assessed Today:  No    Healthy Coping:  Healthy Coping Assessed Today: Yes (stress level has dropped, purposefully trying not to sweat the small stuff)  Emotional response to diabetes: Ready to learn,Acceptance,Confidence diabetes can be controlled  Informal Support system:: Family  Stage of change: ACTION (Actively working towards change)  Patient Activation Measure Survey Score:  No flowsheet data found.      Nichole Hector RD, LD, [facility] [place]    Time Spent: 60 minutes  Encounter Type: Individual        Any diabetes medication dose changes were made via the Certified " Diabetes Care & Education Protocol in collaboration with the patient's referring provider. A copy of this encounter was shared with the provider.

## 2022-01-18 NOTE — PROGRESS NOTES
Diabetes Self-Management Education & Support    Presents for: Initial Assessment for new diagnosis    Type of Visit: In Person    How would patient like to obtain AVS? declined    ASSESSMENT:  Hill is accompanied by wife, Cher.   He has already made positive changes: eating less, taking med, and lost weight (~9#).  Remote family history of diabetes in 1 relative.   He says the diagnosis has been a bit of a boost to think about making changes    Patient's most recent   Lab Results   Component Value Date    A1C 10.1 12/10/2021    is not meeting goal of <7.0    Diabetes knowledge and skills assessment:   Patient is knowledgeable in diabetes management concepts related to: needs comprehensive ed for new dx    Continue education with the following diabetes management concepts: Healthy Eating, Being Active, Monitoring, Taking Medication, Problem Solving, Reducing Risks and Healthy Coping    Based on learning assessment above, most appropriate setting for further diabetes education would be: Individual setting.    INTERVENTIONS:    Education provided today on:  AADE Self-Care Behaviors:  Diabetes Pathophysiology    Healthy Eating: carbohydrate counting, portion control, plate planning method, label reading and rec 30-60 g carb/meal, reviewed V-8 low sodium drink as possible morning alternative as well as the zero mignon drinks he is using    Being Active: relationship to blood glucose    Monitoring: purpose, proper technique, log and interpret results, individual blood glucose targets and frequency of monitoring    Taking Medication: action of prescribed medication, side effects of prescribed medications, when to take medications and will change to XR with some GI upset    Healthy Coping: recognize feelings about diagnosis, benefits of making appropriate lifestyle changes and utilize support systems    Opportunities for ongoing education and support in diabetes-self management were discussed. Pt verbalized understanding of  "concepts discussed and recommendations provided today.       Education Materials Provided:  BG Log Sheet, Carbohydrate Counting, My Plate Planner and BG log book      PLAN  1) continue with smaller food portions, aim for 30-60 g carb/meal  2) choose high fiber foods where possible  3) check BG once daily at varying times    Topics to cover at upcoming visits: Monitoring, Problem Solving and Reducing Risks  Follow-up: Feb    See Goals Section for co-developed, patient-stated behavior change goals.      SUBJECTIVE / OBJECTIVE:  Presents for: Initial Assessment for new diagnosis  Accompanied by: Spouse (Cher)  Diabetes education in the past 24mo: No  Focus of Visit: Assistance w/ making life changes  Diabetes type: Type 2  Date of diagnosis: December 2021  How confident are you filling out medical forms by yourself:: Extremely  Diabetes management related comments/concerns: what are the foods  Other concerns:: None  Cultural Influences/Ethnic Background:  American      Diabetes Symptoms & Complications:  Visual change: No (but eye doctor identified high BP and then DB was identified at HTN check)  Other: Sometimes (heart flutters, some light headedness)  Weight trend: Decreasing (had been increasing over time, but now has lost 5#)       Patient Problem List and Family Medical History reviewed for relevant medical history, current medical status, and diabetes risk factors.    Vitals:  There were no vitals taken for this visit.  Estimated body mass index is 36.19 kg/m  as calculated from the following:    Height as of 8/24/20: 1.689 m (5' 6.5\").    Weight as of 12/15/21: 103.2 kg (227 lb 9.6 oz).   Last 3 BP:   BP Readings from Last 3 Encounters:   12/15/21 124/80   12/12/21 (!) 157/92   12/10/21 (!) 146/74       History   Smoking Status     Former Smoker     Packs/day: 0.25     Years: 20.00     Types: Dip, chew, snus or snuff   Smokeless Tobacco     Current User     Types: Chew     Comment: 1 tin every 2 weeks "       Labs:  Lab Results   Component Value Date    A1C 10.1 12/10/2021     Lab Results   Component Value Date     12/12/2021     12/10/2021     09/02/2020     Lab Results   Component Value Date     12/15/2021     09/02/2020     HDL Cholesterol   Date Value Ref Range Status   09/02/2020 43 >39 mg/dL Final     Direct Measure HDL   Date Value Ref Range Status   12/15/2021 43 >=40 mg/dL Final   ]  GFR Estimate   Date Value Ref Range Status   12/10/2021 87 >60 mL/min/1.73m2 Final     Comment:     As of July 11, 2021, eGFR is calculated by the CKD-EPI creatinine equation, without race adjustment. eGFR can be influenced by muscle mass, exercise, and diet. The reported eGFR is an estimation only and is only applicable if the renal function is stable.   09/02/2020 78 >60 mL/min/[1.73_m2] Final     Comment:     Non  GFR Calc  Starting 12/18/2018, serum creatinine based estimated GFR (eGFR) will be   calculated using the Chronic Kidney Disease Epidemiology Collaboration   (CKD-EPI) equation.       GFR Estimate If Black   Date Value Ref Range Status   09/02/2020 >90 >60 mL/min/[1.73_m2] Final     Comment:      GFR Calc  Starting 12/18/2018, serum creatinine based estimated GFR (eGFR) will be   calculated using the Chronic Kidney Disease Epidemiology Collaboration   (CKD-EPI) equation.       Lab Results   Component Value Date    CR 0.96 12/10/2021    CR 1.06 09/02/2020     No results found for: MICROALBUMIN    Healthy Eating:  Healthy Eating Assessed Today: Yes (sweet craving has decreased)  Meal planning/habits: Smaller portions (changing products to less sugar and salt too)  ~ they have changed to more whole grains, higher fiber items, increasing produce intake  Meals include: Breakfast,Lunch,Dinner  Beverages: Water,Coffee,Sports drinks (Arnold Ortega light; gatorade zero, 7-up zero)  Has patient met with a dietitian in the past?: No        Being  "Active:  Being Active Assessed Today: Yes  Exercise:: Yes (daughter lives nearby and drops the dog off for the day- he walks the dog)  Days per week of moderate to strenuous exercise (like a brisk walk): 3 (3-4 depends on weather)  On average, minutes per day of exercise at this level: 30 (they have a treadmill)  How intense was your typical exercise? : Moderate (like brisk walking)  Exercise Minutes per Week: 90  Barrier to exercise: None    Monitoring:  Monitoring Assessed Today: Yes  Did patient bring glucose meter to appointment? : No    Glucose data:  None available, ordering meter today    Taking Medications:  Diabetes Medication(s)     Biguanides       metFORMIN (GLUCOPHAGE) 1000 MG tablet    Take 1 tablet (1,000 mg) by mouth 2 times daily (with meals)        Taking Medication Assessed Today: Yes  Current Treatments: Oral Medication (taken by mouth)  Problems taking diabetes medications regularly?: No  Diabetes medication side effects?: Yes (some ongoing GI \"rumble\" and occasional diarrhea) will change to XR    Problem Solving:  Problem Solving Assessed Today: No    Reducing Risks:  Reducing Risks Assessed Today:  No    Healthy Coping:  Healthy Coping Assessed Today: Yes (stress level has dropped, purposefully trying not to sweat the small stuff)  Emotional response to diabetes: Ready to learn,Acceptance,Confidence diabetes can be controlled  Informal Support system:: Family  Stage of change: ACTION (Actively working towards change)  Patient Activation Measure Survey Score:  No flowsheet data found.      Nichole Hector RD, LD, [facility]    Time Spent: 60 minutes  Encounter Type: Individual        Any diabetes medication dose changes were made via the Certified Diabetes Care & Education Protocol in collaboration with the patient's referring provider. A copy of this encounter was shared with the provider.        "

## 2022-03-01 ENCOUNTER — ALLIED HEALTH/NURSE VISIT (OUTPATIENT)
Dept: EDUCATION SERVICES | Facility: CLINIC | Age: 57
End: 2022-03-01
Payer: COMMERCIAL

## 2022-03-01 DIAGNOSIS — E11.65 TYPE 2 DIABETES MELLITUS WITH HYPERGLYCEMIA, WITHOUT LONG-TERM CURRENT USE OF INSULIN (H): Primary | ICD-10-CM

## 2022-03-01 PROCEDURE — 98967 PH1 ASSMT&MGMT NQHP 11-20: CPT | Performed by: DIETITIAN, REGISTERED

## 2022-03-01 NOTE — Clinical Note
Hill is doing well with diabetes self-care! BG in target.   Thank you! Nichole Hector RD, LD, [facility]

## 2022-03-04 NOTE — PROGRESS NOTES
Diabetes Self-Management Education & Support    Presents for: Follow-up    Type of Visit: Video Visit    How would patient like to obtain AVS? MyChart    ASSESSMENT:  Hill is doing every well with diabetes self care! Expresses confidence and able to identify the positive changes he has made.    Patient's most recent   Lab Results   Component Value Date    A1C 10.1 12/10/2021    is not meeting goal of <7.0    Diabetes knowledge and skills assessment:   Patient is knowledgeable in diabetes management concepts related to: Healthy Eating, Being Active, Monitoring, Taking Medication, Problem Solving, Reducing Risks and Healthy Coping    Continue education with the following diabetes management concepts: Healthy Eating and Monitoring    Based on learning assessment above, most appropriate setting for further diabetes education would be: Individual setting.    INTERVENTIONS:    Education provided today on:  AADE Self-Care Behaviors:  Healthy Eating: he is using carb counting and portion control. Choosing higher fiber options when able.     Monitoring: individual blood glucose targets and frequency of monitoring    Opportunities for ongoing education and support in diabetes-self management were discussed. Pt verbalized understanding of concepts discussed and recommendations provided today.       Education Materials Provided:  No new materials provided today      PLAN  1) continue current diabetes self-care  2) follow up with CDE annual or if needs arise    Topics to cover at upcoming visits: as desired  Follow-up: annually or if needs arise    See Goals Section for co-developed, patient-stated behavior change goals.      SUBJECTIVE / OBJECTIVE:  Presents for: Follow-up  Accompanied by: Self  Diabetes education in the past 24mo: Yes  Diabetes type: Type 2  Date of diagnosis: Dec 2021  Disease course: Stable  How confident are you filling out medical forms by yourself:: Extremely  Other concerns:: None  Cultural  "Influences/Ethnic Background:  American      Diabetes Symptoms & Complications:  Fatigue: No  Neuropathy: No  Polydipsia: No  Polyphagia: No  Polyuria: No  Visual change: Sometimes  Slow healing wounds: No  Autonomic neuropathy: No  CVA: No  Heart disease: No  Nephropathy: No  Peripheral neuropathy: No  Peripheral Vascular Disease: No  Retinopathy: No  Sexual dysfunction: No    Patient Problem List and Family Medical History reviewed for relevant medical history, current medical status, and diabetes risk factors.    Vitals:  There were no vitals taken for this visit.  Estimated body mass index is 34.96 kg/m  as calculated from the following:    Height as of 8/24/20: 1.689 m (5' 6.5\").    Weight as of 1/18/22: 99.7 kg (219 lb 14.4 oz).   Last 3 BP:   BP Readings from Last 3 Encounters:   12/15/21 124/80   12/12/21 (!) 157/92   12/10/21 (!) 146/74       History   Smoking Status     Former Smoker     Packs/day: 0.25     Years: 20.00     Types: Dip, chew, snus or snuff   Smokeless Tobacco     Current User     Types: Chew     Comment: 1 tin every 2 weeks       Labs:  Lab Results   Component Value Date    A1C 10.1 12/10/2021     Lab Results   Component Value Date     12/12/2021     12/10/2021     09/02/2020     Lab Results   Component Value Date     12/15/2021     09/02/2020     HDL Cholesterol   Date Value Ref Range Status   09/02/2020 43 >39 mg/dL Final     Direct Measure HDL   Date Value Ref Range Status   12/15/2021 43 >=40 mg/dL Final   ]  GFR Estimate   Date Value Ref Range Status   12/10/2021 87 >60 mL/min/1.73m2 Final     Comment:     As of July 11, 2021, eGFR is calculated by the CKD-EPI creatinine equation, without race adjustment. eGFR can be influenced by muscle mass, exercise, and diet. The reported eGFR is an estimation only and is only applicable if the renal function is stable.   09/02/2020 78 >60 mL/min/[1.73_m2] Final     Comment:     Non  GFR " Calc  Starting 12/18/2018, serum creatinine based estimated GFR (eGFR) will be   calculated using the Chronic Kidney Disease Epidemiology Collaboration   (CKD-EPI) equation.       GFR Estimate If Black   Date Value Ref Range Status   09/02/2020 >90 >60 mL/min/[1.73_m2] Final     Comment:      GFR Calc  Starting 12/18/2018, serum creatinine based estimated GFR (eGFR) will be   calculated using the Chronic Kidney Disease Epidemiology Collaboration   (CKD-EPI) equation.       Lab Results   Component Value Date    CR 0.96 12/10/2021    CR 1.06 09/02/2020     No results found for: MICROALBUMIN    Healthy Eating:  Healthy Eating Assessed Today: Yes  Cultural/Baptism diet restrictions?: No  Meal planning/habits: Avoiding sweets, Low carb, Low salt, Smaller portions (keeps to carb goals nearly all the time, occasionally extra if dine out but BG comes back to target)  How many times a week on average do you eat food made away from home (restaurant/take-out)?: 2  Meals include: Breakfast, Lunch, Dinner  Breakfast: Dodie Kelly whole grain bread ,fruit every morning-  half a grapefuit 4x/week, raspberries & blueberries in yogurt with granola  Dinner: lots of fresh veggies variety every day- made salmon chowder this week  Other: aiming for 30-60 g carb/meal and high fiber options  Beverages: Water, Coffee  Has patient met with a dietitian in the past?: Yes    Being Active:  Being Active Assessed Today: Yes (Plans to increase)  Exercise:: Yes (has treadmill at home)  Barrier to exercise: Access    Monitoring:  Monitoring Assessed Today: Yes  Did patient bring glucose meter to appointment? : Yes (brought log)  Blood Glucose Meter: Unknown  Times checking blood sugar at home (number): 1  Times checking blood sugar at home (per): Day  Blood glucose trend: Decreasing    Glucose data:    Date Breakfast  Lunch Dinner Bedtime    Before After Before Before    3/1  143       114  134 112 106    108  103 120 102    112     113    108    114       Taking Medications:  Diabetes Medication(s)     Biguanides       metFORMIN (GLUCOPHAGE-XR) 500 MG 24 hr tablet    Take 4 tablets (2,000 mg) by mouth daily (with dinner)          Taking Medication Assessed Today: Yes  Current Treatments: Oral Medication (taken by mouth) (has changed to XR Metformin and says it has eliminated GI issues)  Problems taking diabetes medications regularly?: No  Diabetes medication side effects?: No    Problem Solving:  Is the patient at risk for hypoglycemia?: No  Is the patient at risk for DKA?: No              Reducing Risks:  Reducing Risks Assessed Today: Yes  CAD Risks: Diabetes Mellitus, Dyslipidemia, Hypertension, Male sex, Obesity  Has dilated eye exam at least once a year?: Yes  Sees dentist every 6 months?: Yes  Feet checked by healthcare provider in the last year?: Yes    Healthy Coping:  Healthy Coping Assessed Today: Yes  Emotional response to diabetes: Ready to learn, Confidence diabetes can be controlled  Informal Support system:: Family, Friends, Spouse  Stage of change: MAINTENANCE (Working to maintain change, with risk of relapse)  Patient Activation Measure Survey Score:  No flowsheet data found.      Nichole Hector RD, LD, [facility]    Time Spent: 15 minutes  Encounter Type: Individual      Any diabetes medication dose changes were made via the Certified Diabetes Care & Education Protocol in collaboration with the patient's referring provider. A copy of this encounter was shared with the provider.

## 2022-03-21 ENCOUNTER — OFFICE VISIT (OUTPATIENT)
Dept: INTERNAL MEDICINE | Facility: CLINIC | Age: 57
End: 2022-03-21
Payer: COMMERCIAL

## 2022-03-21 VITALS
DIASTOLIC BLOOD PRESSURE: 73 MMHG | BODY MASS INDEX: 34.29 KG/M2 | OXYGEN SATURATION: 96 % | SYSTOLIC BLOOD PRESSURE: 112 MMHG | HEART RATE: 84 BPM | TEMPERATURE: 98.6 F | WEIGHT: 215.7 LBS

## 2022-03-21 DIAGNOSIS — I10 ESSENTIAL HYPERTENSION: ICD-10-CM

## 2022-03-21 DIAGNOSIS — E11.65 TYPE 2 DIABETES MELLITUS WITH HYPERGLYCEMIA, WITHOUT LONG-TERM CURRENT USE OF INSULIN (H): Primary | ICD-10-CM

## 2022-03-21 DIAGNOSIS — E66.811 OBESITY (BMI 30.0-34.9): ICD-10-CM

## 2022-03-21 DIAGNOSIS — E78.5 HYPERLIPIDEMIA LDL GOAL <100: ICD-10-CM

## 2022-03-21 PROBLEM — E66.812 CLASS 2 SEVERE OBESITY WITH SERIOUS COMORBIDITY AND BODY MASS INDEX (BMI) OF 36.0 TO 36.9 IN ADULT, UNSPECIFIED OBESITY TYPE (H): Status: RESOLVED | Noted: 2020-08-24 | Resolved: 2022-03-21

## 2022-03-21 PROBLEM — E66.01 CLASS 2 SEVERE OBESITY WITH SERIOUS COMORBIDITY AND BODY MASS INDEX (BMI) OF 36.0 TO 36.9 IN ADULT, UNSPECIFIED OBESITY TYPE (H): Status: RESOLVED | Noted: 2020-08-24 | Resolved: 2022-03-21

## 2022-03-21 LAB
ALBUMIN SERPL-MCNC: 3.5 G/DL (ref 3.4–5)
ALP SERPL-CCNC: 126 U/L (ref 40–150)
ALT SERPL W P-5'-P-CCNC: 32 U/L (ref 0–70)
ANION GAP SERPL CALCULATED.3IONS-SCNC: 7 MMOL/L (ref 3–14)
AST SERPL W P-5'-P-CCNC: 17 U/L (ref 0–45)
BILIRUB SERPL-MCNC: 0.5 MG/DL (ref 0.2–1.3)
BUN SERPL-MCNC: 10 MG/DL (ref 7–30)
CALCIUM SERPL-MCNC: 9 MG/DL (ref 8.5–10.1)
CHLORIDE BLD-SCNC: 106 MMOL/L (ref 94–109)
CHOLEST SERPL-MCNC: 124 MG/DL
CO2 SERPL-SCNC: 23 MMOL/L (ref 20–32)
CREAT SERPL-MCNC: 0.94 MG/DL (ref 0.66–1.25)
FASTING STATUS PATIENT QL REPORTED: NO
GFR SERPL CREATININE-BSD FRML MDRD: >90 ML/MIN/1.73M2
GLUCOSE BLD-MCNC: 158 MG/DL (ref 70–99)
HBA1C MFR BLD: 6.1 % (ref 0–5.6)
HDLC SERPL-MCNC: 42 MG/DL
LDLC SERPL CALC-MCNC: 47 MG/DL
NONHDLC SERPL-MCNC: 82 MG/DL
POTASSIUM BLD-SCNC: 4.1 MMOL/L (ref 3.4–5.3)
PROT SERPL-MCNC: 7.3 G/DL (ref 6.8–8.8)
SODIUM SERPL-SCNC: 136 MMOL/L (ref 133–144)
TRIGL SERPL-MCNC: 174 MG/DL

## 2022-03-21 PROCEDURE — 36415 COLL VENOUS BLD VENIPUNCTURE: CPT | Performed by: INTERNAL MEDICINE

## 2022-03-21 PROCEDURE — 99214 OFFICE O/P EST MOD 30 MIN: CPT | Performed by: INTERNAL MEDICINE

## 2022-03-21 PROCEDURE — 80053 COMPREHEN METABOLIC PANEL: CPT | Performed by: INTERNAL MEDICINE

## 2022-03-21 PROCEDURE — 83036 HEMOGLOBIN GLYCOSYLATED A1C: CPT | Performed by: INTERNAL MEDICINE

## 2022-03-21 PROCEDURE — 80061 LIPID PANEL: CPT | Performed by: INTERNAL MEDICINE

## 2022-03-21 RX ORDER — LISINOPRIL 20 MG/1
20 TABLET ORAL DAILY
Qty: 90 TABLET | Refills: 3 | Status: SHIPPED | OUTPATIENT
Start: 2022-03-21 | End: 2023-03-06

## 2022-03-21 NOTE — PROGRESS NOTES
Assessment & Plan     Type 2 diabetes mellitus with hyperglycemia, without long-term current use of insulin (H)  Down ~15 lbs. Tolerating extended-release version of metformin well. Exercising more. Eating better. Hill has been doing quite well! On ACEi and statin. Eye exam and foot exam UTD. Will re-check labs today. Continue metformin.   - Comprehensive metabolic panel; Future  - Hemoglobin A1c; Future    Hyperlipidemia LDL goal <100  Tolerating atorvastatin well. Re-check fasting lipid panel today.  - Lipid panel reflex to direct LDL Fasting; Future    Essential hypertension  BP at goal. Tolerating lisinopril well.  - lisinopril (ZESTRIL) 20 MG tablet; Take 1 tablet (20 mg) by mouth daily    Obesity (BMI 30.0-34.9)  Down ~15 labs from last visit. His hope is to lose ~15 more lbs by the end of the summer. Congratulated him on his success thus far!    Return in about 6 months (around 9/21/2022) for Physical Exam, Diabetes Follow-Up.    Gumaro Urena MD  [facility]    Flip Alejandre is a 56 year old who presents for follow-up for his diabetes. Changed his diet to eat healthier food. Exercising more. Tolerating medications well. He's interested to see what his labs today look like.    Review of Systems   Constitutional, HEENT systems are negative, except as otherwise noted.      Objective    /73   Pulse 84   Temp 98.6  F (37  C) (Oral)   Wt 97.8 kg (215 lb 11.2 oz)   SpO2 96%   BMI 34.29 kg/m    Body mass index is 34.29 kg/m .     Physical Exam   GENERAL: alert and in no distress.

## 2022-03-21 NOTE — PATIENT INSTRUCTIONS
- I will send you a message on [employer] when I am able to look at the results of your tests from today  - Stop by our pharmacy downstairs or your preferred pharmacy to discuss obtaining the Shingrix (shingles) vaccine series

## 2022-09-13 ENCOUNTER — OFFICE VISIT (OUTPATIENT)
Dept: INTERNAL MEDICINE | Facility: CLINIC | Age: 57
End: 2022-09-13
Payer: COMMERCIAL

## 2022-09-13 VITALS
OXYGEN SATURATION: 96 % | BODY MASS INDEX: 32.59 KG/M2 | DIASTOLIC BLOOD PRESSURE: 74 MMHG | HEART RATE: 71 BPM | SYSTOLIC BLOOD PRESSURE: 118 MMHG | TEMPERATURE: 98.3 F | RESPIRATION RATE: 18 BRPM | WEIGHT: 205 LBS

## 2022-09-13 DIAGNOSIS — E11.65 TYPE 2 DIABETES MELLITUS WITH HYPERGLYCEMIA, WITHOUT LONG-TERM CURRENT USE OF INSULIN (H): Primary | ICD-10-CM

## 2022-09-13 DIAGNOSIS — M95.4 STERNAL DEFORMITY: ICD-10-CM

## 2022-09-13 DIAGNOSIS — R19.5 LOOSE STOOLS: ICD-10-CM

## 2022-09-13 LAB — HBA1C MFR BLD: 5.7 % (ref 0–5.6)

## 2022-09-13 PROCEDURE — 83036 HEMOGLOBIN GLYCOSYLATED A1C: CPT | Performed by: INTERNAL MEDICINE

## 2022-09-13 PROCEDURE — 99214 OFFICE O/P EST MOD 30 MIN: CPT | Performed by: INTERNAL MEDICINE

## 2022-09-13 PROCEDURE — 36415 COLL VENOUS BLD VENIPUNCTURE: CPT | Performed by: INTERNAL MEDICINE

## 2022-09-13 RX ORDER — GLIPIZIDE 2.5 MG/1
TABLET, EXTENDED RELEASE ORAL
Qty: 170 TABLET | Refills: 0 | Status: SHIPPED | OUTPATIENT
Start: 2022-09-13 | End: 2022-12-14

## 2022-09-13 NOTE — PATIENT INSTRUCTIONS
- STOP metformin  - START glipizide  - Our team will contact you via Tag'Byt (if you sign up for it), telephone call (if results are urgent), or otherwise via letter in the mail with the results of today's lab tests once I have a chance to review them  - Make an appointment with our pharmacy downstairs or stop by your preferred pharmacy to discuss obtaining the Shingrix (shingles) vaccine series

## 2022-09-13 NOTE — PROGRESS NOTES
Assessment & Plan     Type 2 diabetes mellitus with hyperglycemia, without long-term current use of insulin (H)  A1c at goal. Noting loose stools, wondering if related to metformin which it could be. Will change to glipizide. A1c today. On statin and ACEi. UTD on eye exam and UPC. Continues to lose weight - congratulated!  - glipiZIDE (GLUCOTROL XL) 2.5 MG 24 hr tablet; Take 1 tablet (2.5 mg) by mouth daily for 10 days, THEN 2 tablets (5 mg) daily for 80 days.  - Hemoglobin A1c; Future    Loose stools  He does report some classic symptoms of lactose intolerance which I discussed would not improve with med change as above. We'll see if change from metformin to glipizide improves these symptoms though.    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 Not remarked on past CXRs. Will get dedicated sternal XR today.  - XR Sternum 2 Views; Future    Return in about 6 months (around 3/13/2023) for Physical Exam.    Gumaro Urena MD  [facility]    Flip Alejandre is a 57 year old who presents today for diabetes follow-up. Noting loose stools, wondering if related to metformin. He does note he gets bad diarrhea after consuming dairy. He's down ~25 lbs since I first met him. As he loses weight, he has noted a hard prominence on his anterior chest wall. Not painful but he's curious what it could be.    Review of Systems   Constitutional, gi, MSK systems are negative, except as otherwise noted.      Objective    /74   Pulse 71   Temp 98.3  F (36.8  C) (Tympanic)   Resp 18   Wt 93 kg (205 lb)   SpO2 96%   BMI 32.59 kg/m    Body mass index is 32.59 kg/m .     Physical Exam   GENERAL: alert and in no distress.  EYES: conjunctivae/corneas clear. EOMs grossly intact  HENT: Facies symmetric.  RESP: No iWOB.  CHEST: Bony prominence in area of xiphoid process. Not TTP.  MSK: Moves all four extremities freely  SKIN: No significant ulcers, lesions, or rashes on the visualized portions of the  skin  NEURO: CN II-XII grossly intact.

## 2022-10-01 ENCOUNTER — TRANSFERRED RECORDS (OUTPATIENT)
Dept: MULTI SPECIALTY CLINIC | Facility: CLINIC | Age: 57
End: 2022-10-01

## 2022-10-01 LAB — RETINOPATHY: NORMAL

## 2022-10-22 ENCOUNTER — HEALTH MAINTENANCE LETTER (OUTPATIENT)
Age: 57
End: 2022-10-22

## 2022-12-03 DIAGNOSIS — E78.5 HYPERLIPIDEMIA LDL GOAL <100: ICD-10-CM

## 2022-12-05 RX ORDER — ATORVASTATIN CALCIUM 40 MG/1
TABLET, FILM COATED ORAL
Qty: 90 TABLET | Refills: 2 | Status: SHIPPED | OUTPATIENT
Start: 2022-12-05 | End: 2023-04-06

## 2022-12-05 NOTE — TELEPHONE ENCOUNTER
Prescription approved per [facility] Refill Protocol.  Dena BARRERA RN  [facility]

## 2023-02-13 ENCOUNTER — MYC MEDICAL ADVICE (OUTPATIENT)
Dept: INTERNAL MEDICINE | Facility: CLINIC | Age: 58
End: 2023-02-13
Payer: COMMERCIAL

## 2023-03-05 DIAGNOSIS — E11.65 TYPE 2 DIABETES MELLITUS WITH HYPERGLYCEMIA, WITHOUT LONG-TERM CURRENT USE OF INSULIN (H): ICD-10-CM

## 2023-03-05 DIAGNOSIS — I10 ESSENTIAL HYPERTENSION: ICD-10-CM

## 2023-03-06 RX ORDER — GLIPIZIDE 5 MG/1
TABLET, FILM COATED, EXTENDED RELEASE ORAL
Qty: 90 TABLET | Refills: 0 | Status: SHIPPED | OUTPATIENT
Start: 2023-03-06 | End: 2023-04-06

## 2023-03-06 RX ORDER — LISINOPRIL 20 MG/1
TABLET ORAL
Qty: 90 TABLET | Refills: 0 | Status: SHIPPED | OUTPATIENT
Start: 2023-03-06 | End: 2023-04-06

## 2023-04-01 ENCOUNTER — HEALTH MAINTENANCE LETTER (OUTPATIENT)
Age: 58
End: 2023-04-01

## 2023-04-06 ENCOUNTER — OFFICE VISIT (OUTPATIENT)
Dept: INTERNAL MEDICINE | Facility: CLINIC | Age: 58
End: 2023-04-06
Payer: COMMERCIAL

## 2023-04-06 VITALS
HEART RATE: 90 BPM | TEMPERATURE: 97.7 F | DIASTOLIC BLOOD PRESSURE: 81 MMHG | WEIGHT: 214.2 LBS | BODY MASS INDEX: 34.05 KG/M2 | SYSTOLIC BLOOD PRESSURE: 125 MMHG | OXYGEN SATURATION: 98 %

## 2023-04-06 DIAGNOSIS — I10 ESSENTIAL HYPERTENSION: ICD-10-CM

## 2023-04-06 DIAGNOSIS — E78.5 HYPERLIPIDEMIA LDL GOAL <100: ICD-10-CM

## 2023-04-06 DIAGNOSIS — E11.65 TYPE 2 DIABETES MELLITUS WITH HYPERGLYCEMIA, WITHOUT LONG-TERM CURRENT USE OF INSULIN (H): Primary | ICD-10-CM

## 2023-04-06 LAB
ANION GAP SERPL CALCULATED.3IONS-SCNC: 11 MMOL/L (ref 7–15)
BUN SERPL-MCNC: 14.8 MG/DL (ref 6–20)
CALCIUM SERPL-MCNC: 9.3 MG/DL (ref 8.6–10)
CHLORIDE SERPL-SCNC: 104 MMOL/L (ref 98–107)
CHOLEST SERPL-MCNC: 132 MG/DL
CREAT SERPL-MCNC: 1.06 MG/DL (ref 0.67–1.17)
CREAT UR-MCNC: 180 MG/DL
DEPRECATED HCO3 PLAS-SCNC: 23 MMOL/L (ref 22–29)
GFR SERPL CREATININE-BSD FRML MDRD: 81 ML/MIN/1.73M2
GLUCOSE SERPL-MCNC: 138 MG/DL (ref 70–99)
HBA1C MFR BLD: 5.7 % (ref 0–5.6)
HDLC SERPL-MCNC: 43 MG/DL
LDLC SERPL CALC-MCNC: 76 MG/DL
MICROALBUMIN UR-MCNC: <12 MG/L
MICROALBUMIN/CREAT UR: NORMAL MG/G{CREAT}
NONHDLC SERPL-MCNC: 89 MG/DL
POTASSIUM SERPL-SCNC: 4.5 MMOL/L (ref 3.4–5.3)
SODIUM SERPL-SCNC: 138 MMOL/L (ref 136–145)
TRIGL SERPL-MCNC: 63 MG/DL

## 2023-04-06 PROCEDURE — 36415 COLL VENOUS BLD VENIPUNCTURE: CPT | Performed by: INTERNAL MEDICINE

## 2023-04-06 PROCEDURE — 83036 HEMOGLOBIN GLYCOSYLATED A1C: CPT | Performed by: INTERNAL MEDICINE

## 2023-04-06 PROCEDURE — 80048 BASIC METABOLIC PNL TOTAL CA: CPT | Performed by: INTERNAL MEDICINE

## 2023-04-06 PROCEDURE — 82043 UR ALBUMIN QUANTITATIVE: CPT | Performed by: INTERNAL MEDICINE

## 2023-04-06 PROCEDURE — 80061 LIPID PANEL: CPT | Performed by: INTERNAL MEDICINE

## 2023-04-06 PROCEDURE — 82570 ASSAY OF URINE CREATININE: CPT | Performed by: INTERNAL MEDICINE

## 2023-04-06 PROCEDURE — 99214 OFFICE O/P EST MOD 30 MIN: CPT | Performed by: INTERNAL MEDICINE

## 2023-04-06 PROCEDURE — 99207 PR FOOT EXAM NO CHARGE: CPT | Mod: 25 | Performed by: INTERNAL MEDICINE

## 2023-04-06 RX ORDER — ATORVASTATIN CALCIUM 40 MG/1
40 TABLET, FILM COATED ORAL DAILY
Qty: 90 TABLET | Refills: 3 | Status: SHIPPED | OUTPATIENT
Start: 2023-04-06 | End: 2023-11-04

## 2023-04-06 RX ORDER — LISINOPRIL 20 MG/1
20 TABLET ORAL DAILY
Qty: 90 TABLET | Refills: 3 | Status: SHIPPED | OUTPATIENT
Start: 2023-04-06 | End: 2023-11-04

## 2023-04-06 RX ORDER — GLIPIZIDE 5 MG/1
5 TABLET, FILM COATED, EXTENDED RELEASE ORAL DAILY
Qty: 90 TABLET | Refills: 3 | Status: SHIPPED | OUTPATIENT
Start: 2023-04-06 | End: 2023-04-07

## 2023-04-06 NOTE — PATIENT INSTRUCTIONS
- I will send you a message on Zoodig when I am able to look at the results of your tests from today    - Make an appointment with our pharmacy downstairs or stop by your preferred pharmacy to discuss obtaining the Shingrix (shingles) vaccine series

## 2023-04-06 NOTE — PROGRESS NOTES
Assessment & Plan   Type 2 diabetes mellitus with hyperglycemia, without long-term current use of insulin (H)  Switched from metformin to glipizide due to bowel side effects and those have resolved on glipizide thankfully. He'd like to continue. BG at goal at home when he checks them on occasion. Foot exam WNL today. Labs today. Continue glipizide.  - Hemoglobin A1c; Future  - Albumin Random Urine Quantitative with Creat Ratio; Future  - FOOT EXAM  NO CHARGE [[id number]]  - glipiZIDE (GLUCOTROL XL) 5 MG 24 hr tablet; Take 1 tablet (5 mg) by mouth daily    Hyperlipidemia LDL goal <100  Tolerating atorvastatin well. Refilled. Fasting labs today.  - Lipid panel reflex to direct LDL Fasting; Future  - atorvastatin (LIPITOR) 40 MG tablet; Take 1 tablet (40 mg) by mouth daily    Essential hypertension  BP at goal. Tolerating lisinopril well. Refilled.  - Basic metabolic panel; Future  - lisinopril (ZESTRIL) 20 MG tablet; Take 1 tablet (20 mg) by mouth daily    Gumaro Urena MD  [facility]    Flip Alejandre is a 57 year old who presents to clinic today for follow-up on his chronic health conditions. Checks his BG once a month, range is 100-110. Tolerating meds well. Diarrhea improved off metformin. Having trouble with [facility] dispensing his meds at the same time.    Review of Systems   Constitutional, cardiovascular systems are negative, except as otherwise noted.      Objective    /81   Pulse 90   Temp 97.7  F (36.5  C) (Temporal)   Wt 97.2 kg (214 lb 3.2 oz)   SpO2 98%   BMI 34.05 kg/m    Body mass index is 34.05 kg/m .     Physical Exam   GENERAL: alert and in no distress.  EYES: conjunctivae/corneas clear. EOMs grossly intact  HENT: Facies symmetric.  RESP: No iWOB.  MSK: Moves all four extremities freely  FOOT EXAM: No ulcers/sores. Sensation to monofilament intact.  SKIN: No significant ulcers, lesions, or rashes on the visualized portions of the skin  NEURO: CN II-XII  grossly intact.

## 2023-04-07 RX ORDER — GLIPIZIDE 2.5 MG/1
2.5 TABLET, EXTENDED RELEASE ORAL DAILY
Qty: 90 TABLET | Refills: 3 | Status: SHIPPED | OUTPATIENT
Start: 2023-04-07 | End: 2023-11-04

## 2023-08-27 ENCOUNTER — HEALTH MAINTENANCE LETTER (OUTPATIENT)
Age: 58
End: 2023-08-27

## 2023-09-21 ENCOUNTER — TRANSFERRED RECORDS (OUTPATIENT)
Dept: MULTI SPECIALTY CLINIC | Facility: CLINIC | Age: 58
End: 2023-09-21
Payer: COMMERCIAL

## 2023-09-21 LAB — RETINOPATHY: NEGATIVE

## 2023-10-17 ENCOUNTER — OFFICE VISIT (OUTPATIENT)
Dept: INTERNAL MEDICINE | Facility: CLINIC | Age: 58
End: 2023-10-17
Payer: COMMERCIAL

## 2023-10-17 VITALS
DIASTOLIC BLOOD PRESSURE: 70 MMHG | SYSTOLIC BLOOD PRESSURE: 106 MMHG | BODY MASS INDEX: 32.87 KG/M2 | HEIGHT: 67 IN | HEART RATE: 88 BPM | TEMPERATURE: 98.8 F | WEIGHT: 209.4 LBS

## 2023-10-17 DIAGNOSIS — E11.65 TYPE 2 DIABETES MELLITUS WITH HYPERGLYCEMIA, WITHOUT LONG-TERM CURRENT USE OF INSULIN (H): Primary | ICD-10-CM

## 2023-10-17 DIAGNOSIS — Z12.5 SCREENING FOR PROSTATE CANCER: ICD-10-CM

## 2023-10-17 LAB
HBA1C MFR BLD: 5.5 % (ref 0–5.6)
PSA SERPL DL<=0.01 NG/ML-MCNC: 0.93 NG/ML (ref 0–3.5)

## 2023-10-17 PROCEDURE — 36415 COLL VENOUS BLD VENIPUNCTURE: CPT | Performed by: INTERNAL MEDICINE

## 2023-10-17 PROCEDURE — 83036 HEMOGLOBIN GLYCOSYLATED A1C: CPT | Performed by: INTERNAL MEDICINE

## 2023-10-17 PROCEDURE — G0103 PSA SCREENING: HCPCS | Performed by: INTERNAL MEDICINE

## 2023-10-17 PROCEDURE — 99213 OFFICE O/P EST LOW 20 MIN: CPT | Performed by: INTERNAL MEDICINE

## 2023-10-17 ASSESSMENT — PAIN SCALES - GENERAL: PAINLEVEL: NO PAIN (0)

## 2023-10-17 NOTE — PROGRESS NOTES
"Assessment & Plan   Type 2 diabetes mellitus with hyperglycemia, without long-term current use of insulin (H)  Tolerating glipizide 2.5mg daily well. Did not tolerate metformin due to GI side effects. Will check A1c today and adjust meds if needed. Hill has made numerous positive lifestyle changes with his diet and exercise and he continues to lose weight - I congratulated him and encouraged him to keep at it!  - Hemoglobin A1c; Future    Screening for prostate cancer  PSA WNL 3 years ago.  - Prostate Specific Antigen Screen; Future    Gumaro Urena MD  [facility]    Subjective   Hill is a 58 year old who presents for follow-up on his diabetes. He feels he's tolerating decreased glipizide dose. He's lost 5 lbs. He has checked his BG ~3x since I last saw him and they've been in the 130s. Denies s/sx of hypoglycemia. Recent normal eye exam last month.     Review of Systems   Cardiovascular systems are negative, except as otherwise noted.      Objective    /70   Pulse 88   Temp 98.8  F (37.1  C) (Oral)   Ht 1.702 m (5' 7\")   Wt 95 kg (209 lb 6.4 oz)   BMI 32.80 kg/m    Body mass index is 32.8 kg/m .    Physical Exam   GENERAL: alert and in no distress.  EYES: conjunctivae/corneas clear. EOMs grossly intact  HENT: Facies symmetric.  RESP: No iWOB.  MSK: Moves all four extremities freely  SKIN: No significant ulcers, lesions, or rashes on the visualized portions of the skin  NEURO: CN II-XII grossly intact.  "

## 2023-10-17 NOTE — PATIENT INSTRUCTIONS
- I will send you a message on SIMI when I am able to look at the results of your tests from today

## 2023-11-04 ENCOUNTER — MYC REFILL (OUTPATIENT)
Dept: EDUCATION SERVICES | Facility: CLINIC | Age: 58
End: 2023-11-04
Payer: COMMERCIAL

## 2023-11-04 DIAGNOSIS — E11.65 TYPE 2 DIABETES MELLITUS WITH HYPERGLYCEMIA, WITHOUT LONG-TERM CURRENT USE OF INSULIN (H): ICD-10-CM

## 2023-11-04 DIAGNOSIS — I10 ESSENTIAL HYPERTENSION: ICD-10-CM

## 2023-11-04 DIAGNOSIS — E78.5 HYPERLIPIDEMIA LDL GOAL <100: ICD-10-CM

## 2023-11-05 ENCOUNTER — HEALTH MAINTENANCE LETTER (OUTPATIENT)
Age: 58
End: 2023-11-05

## 2023-11-05 RX ORDER — ATORVASTATIN CALCIUM 40 MG/1
40 TABLET, FILM COATED ORAL DAILY
Qty: 90 TABLET | Refills: 3 | Status: SHIPPED | OUTPATIENT
Start: 2023-11-05 | End: 2024-10-03

## 2023-11-05 RX ORDER — LANCETS
EACH MISCELLANEOUS
Qty: 100 EACH | Refills: 3 | Status: SHIPPED | OUTPATIENT
Start: 2023-11-05

## 2023-11-05 RX ORDER — BLOOD SUGAR DIAGNOSTIC
STRIP MISCELLANEOUS
Qty: 50 STRIP | Refills: 11 | Status: SHIPPED | OUTPATIENT
Start: 2023-11-05

## 2023-11-05 RX ORDER — GLIPIZIDE 2.5 MG/1
2.5 TABLET, EXTENDED RELEASE ORAL DAILY
Qty: 90 TABLET | Refills: 3 | Status: SHIPPED | OUTPATIENT
Start: 2023-11-05 | End: 2024-10-03

## 2023-11-05 RX ORDER — LISINOPRIL 20 MG/1
20 TABLET ORAL DAILY
Qty: 90 TABLET | Refills: 3 | Status: SHIPPED | OUTPATIENT
Start: 2023-11-05 | End: 2024-10-03

## 2024-03-24 ENCOUNTER — HEALTH MAINTENANCE LETTER (OUTPATIENT)
Age: 59
End: 2024-03-24

## 2024-04-17 SDOH — HEALTH STABILITY: PHYSICAL HEALTH: ON AVERAGE, HOW MANY MINUTES DO YOU ENGAGE IN EXERCISE AT THIS LEVEL?: 50 MIN

## 2024-04-17 SDOH — HEALTH STABILITY: PHYSICAL HEALTH: ON AVERAGE, HOW MANY DAYS PER WEEK DO YOU ENGAGE IN MODERATE TO STRENUOUS EXERCISE (LIKE A BRISK WALK)?: 5 DAYS

## 2024-04-17 ASSESSMENT — SOCIAL DETERMINANTS OF HEALTH (SDOH): HOW OFTEN DO YOU GET TOGETHER WITH FRIENDS OR RELATIVES?: TWICE A WEEK

## 2024-04-18 ENCOUNTER — OFFICE VISIT (OUTPATIENT)
Dept: INTERNAL MEDICINE | Facility: CLINIC | Age: 59
End: 2024-04-18
Attending: INTERNAL MEDICINE
Payer: COMMERCIAL

## 2024-04-18 VITALS
BODY MASS INDEX: 34.12 KG/M2 | DIASTOLIC BLOOD PRESSURE: 64 MMHG | OXYGEN SATURATION: 98 % | TEMPERATURE: 97.9 F | HEIGHT: 66 IN | RESPIRATION RATE: 14 BRPM | SYSTOLIC BLOOD PRESSURE: 104 MMHG | HEART RATE: 75 BPM | WEIGHT: 212.3 LBS

## 2024-04-18 DIAGNOSIS — I10 ESSENTIAL HYPERTENSION: ICD-10-CM

## 2024-04-18 DIAGNOSIS — E11.65 TYPE 2 DIABETES MELLITUS WITH HYPERGLYCEMIA, WITHOUT LONG-TERM CURRENT USE OF INSULIN (H): ICD-10-CM

## 2024-04-18 DIAGNOSIS — E78.5 HYPERLIPIDEMIA LDL GOAL <100: ICD-10-CM

## 2024-04-18 DIAGNOSIS — Z00.00 ROUTINE GENERAL MEDICAL EXAMINATION AT A HEALTH CARE FACILITY: Primary | ICD-10-CM

## 2024-04-18 LAB
ALT SERPL W P-5'-P-CCNC: 50 U/L (ref 0–70)
ANION GAP SERPL CALCULATED.3IONS-SCNC: 10 MMOL/L (ref 7–15)
BUN SERPL-MCNC: 14 MG/DL (ref 8–23)
CALCIUM SERPL-MCNC: 9.3 MG/DL (ref 8.6–10)
CHLORIDE SERPL-SCNC: 104 MMOL/L (ref 98–107)
CHOLEST SERPL-MCNC: 133 MG/DL
CREAT SERPL-MCNC: 1.01 MG/DL (ref 0.67–1.17)
CREAT UR-MCNC: 134 MG/DL
DEPRECATED HCO3 PLAS-SCNC: 24 MMOL/L (ref 22–29)
EGFRCR SERPLBLD CKD-EPI 2021: 86 ML/MIN/1.73M2
FASTING STATUS PATIENT QL REPORTED: YES
GLUCOSE SERPL-MCNC: 115 MG/DL (ref 70–99)
HBA1C MFR BLD: 5.6 % (ref 0–5.6)
HDLC SERPL-MCNC: 41 MG/DL
LDLC SERPL CALC-MCNC: 80 MG/DL
MICROALBUMIN UR-MCNC: <12 MG/L
MICROALBUMIN/CREAT UR: NORMAL MG/G{CREAT}
NONHDLC SERPL-MCNC: 92 MG/DL
POTASSIUM SERPL-SCNC: 4.6 MMOL/L (ref 3.4–5.3)
SODIUM SERPL-SCNC: 138 MMOL/L (ref 135–145)
TRIGL SERPL-MCNC: 62 MG/DL

## 2024-04-18 PROCEDURE — 99396 PREV VISIT EST AGE 40-64: CPT | Performed by: INTERNAL MEDICINE

## 2024-04-18 PROCEDURE — 36415 COLL VENOUS BLD VENIPUNCTURE: CPT | Performed by: INTERNAL MEDICINE

## 2024-04-18 PROCEDURE — 83036 HEMOGLOBIN GLYCOSYLATED A1C: CPT | Performed by: INTERNAL MEDICINE

## 2024-04-18 PROCEDURE — 82570 ASSAY OF URINE CREATININE: CPT | Performed by: INTERNAL MEDICINE

## 2024-04-18 PROCEDURE — 99214 OFFICE O/P EST MOD 30 MIN: CPT | Mod: 25 | Performed by: INTERNAL MEDICINE

## 2024-04-18 PROCEDURE — 80048 BASIC METABOLIC PNL TOTAL CA: CPT | Performed by: INTERNAL MEDICINE

## 2024-04-18 PROCEDURE — 80061 LIPID PANEL: CPT | Performed by: INTERNAL MEDICINE

## 2024-04-18 PROCEDURE — 82043 UR ALBUMIN QUANTITATIVE: CPT | Performed by: INTERNAL MEDICINE

## 2024-04-18 PROCEDURE — 84460 ALANINE AMINO (ALT) (SGPT): CPT | Performed by: INTERNAL MEDICINE

## 2024-04-18 PROCEDURE — 99207 PR FOOT EXAM NO CHARGE: CPT | Mod: 25 | Performed by: INTERNAL MEDICINE

## 2024-04-18 NOTE — PATIENT INSTRUCTIONS
- I will send you a message on [employer] when I am able to look at the results of your tests from today    - Make an appointment with our pharmacy downstairs or stop by your preferred pharmacy to discuss obtaining the Shingrix (shingles) vaccine series

## 2024-04-18 NOTE — PROGRESS NOTES
"Preventive Care Visit  [facility]  Gumaro Urena MD, Internal Medicine  Apr 18, 2024    Assessment & Plan   Routine general medical examination at a health care facility  Reviewed PMH. Discussed healthcare maintenance issues, including cancer screenings, relevant immunizations (encouraged patient obtain Shingrix through a pharmacy), and cardiac risk factor screenings such as for cholesterol, HTN, and DM.    Type 2 diabetes mellitus with hyperglycemia, without long-term current use of insulin (H)  A1c normal on last check. Discussed STOPPING the low-dose glipizide he's on but he preferred to continue for another 6 months. Will re-check today. Again discussed the possibility of stopping glipizide if his A1c remains normal. He will think about it. Lab check today. Foot exam done today.  - Hemoglobin A1c; Future  - Albumin Random Urine Quantitative with Creat Ratio; Future  - FOOT EXAM    Essential hypertension  BP at goal.  - Basic metabolic panel; Future    Hyperlipidemia LDL goal <100  Fasting lab check today.  - Lipid panel reflex to direct LDL Fasting; Future  - ALT; Future    BMI  Estimated body mass index is 34.27 kg/m  as calculated from the following:    Height as of this encounter: 1.676 m (5' 6\").    Weight as of this encounter: 96.3 kg (212 lb 4.8 oz).     Counseling  Appropriate preventive services were discussed with this patient, including applicable screening as appropriate for fall prevention, nutrition, physical activity, Tobacco-use cessation, weight loss and cognition.  Checklist reviewing preventive services available has been given to the patient.  Reviewed patient's diet, addressing concerns and/or questions.   He is at risk for psychosocial distress and has been provided with information to reduce risk.     Signed Electronically by:  Gumaro Urena MD, MPH  [facility]  Internal Medicine    Flip Alejandre is a 58 year old presenting for " the following: Physical    Health Care Directive  Patient does not have a Health Care Directive or Living Will: Discussed advance care planning with patient; information given to patient to review.    MICHELLE Alejandre presents today for a physical exam. We also discussed his diabetes.        4/17/2024   General Health   How would you rate your overall physical health? Good   Feel stress (tense, anxious, or unable to sleep) Only a little   (!) STRESS CONCERN      4/17/2024   Nutrition   Three or more servings of calcium each day? Yes   Diet: Regular (no restrictions)   How many servings of fruit and vegetables per day? (!) 2-3   How many sweetened beverages each day? 0-1         4/17/2024   Exercise   Days per week of moderate/strenous exercise 5 days   Average minutes spent exercising at this level 50 min         4/17/2024   Social Factors   Frequency of gathering with friends or relatives Twice a week   Worry food won't last until get money to buy more No   Food not last or not have enough money for food? No   Do you have housing?  Yes   Are you worried about losing your housing? No   Lack of transportation? No   Unable to get utilities (heat,electricity)? No         4/17/2024   Fall Risk   Fallen 2 or more times in the past year? No   Trouble with walking or balance? No          4/17/2024   Dental   Dentist two times every year? Yes         4/17/2024   TB Screening   Were you born outside of the [place]? No     Today's PHQ-2 Score:       4/17/2024     4:46 PM   PHQ-2 ( 1999 [employer])   Q1: Little interest or pleasure in doing things 0   Q2: Feeling down, depressed or hopeless 0   PHQ-2 Score 0   Q1: Little interest or pleasure in doing things Not at all   Q2: Feeling down, depressed or hopeless Not at all   PHQ-2 Score 0         4/17/2024   Substance Use   Alcohol more than 3/day or more than 7/wk No   Do you use any other substances recreationally? No     Social History     Tobacco Use    Smoking status: Former     Current  "packs/day: 0.25     Average packs/day: 0.3 packs/day for 20.0 years (5.0 ttl pk-yrs)     Types: Cigarettes    Smokeless tobacco: Current     Types: Chew    Tobacco comments:     1 tin every 2 weeks   Vaping Use    Vaping status: Never Used   Substance Use Topics    Alcohol use: Yes     Comment: 1-2 drinks twice weekly    Drug use: No         4/17/2024   STI Screening   New sexual partner(s) since last STI/HIV test? No   Last PSA:   PSA   Date Value Ref Range Status   09/02/2020 1.21 0 - 4 ug/L Final     Comment:     Assay Method:  Chemiluminescence using Siemens Vista analyzer     Prostate Specific Antigen Screen   Date Value Ref Range Status   10/17/2023 0.93 0.00 - 3.50 ng/mL Final     ASCVD Risk   The 10-year ASCVD risk score (Ashutosh DOMINGUEZ, et al., 2019) is: 9.2%    Values used to calculate the score:      Age: 58 years      Sex: Male      Is Non- : No      Diabetic: Yes      Tobacco smoker: No      Systolic Blood Pressure: 104 mmHg      Is BP treated: Yes      HDL Cholesterol: 43 mg/dL      Total Cholesterol: 132 mg/dL    Reviewed and updated as needed this visit by Provider   Tobacco  Allergies  Meds  Problems  Med Hx  Surg Hx  Fam Hx             Objective    Exam  /64   Pulse 75   Temp 97.9  F (36.6  C) (Temporal)   Resp 14   Ht 1.676 m (5' 6\")   Wt 96.3 kg (212 lb 4.8 oz)   SpO2 98%   BMI 34.27 kg/m     Estimated body mass index is 34.27 kg/m  as calculated from the following:    Height as of this encounter: 1.676 m (5' 6\").    Weight as of this encounter: 96.3 kg (212 lb 4.8 oz).    Physical Exam  GENERAL: In no distress.  EYES: Conjunctivae/corneas clear. EOMs grossly intact.  HENT: NC/AT, facies symmetric.  RESP: CTAB. No w/r/r.  CV: RRR, no m/r/g.  GI: NT, ND, without rebound or guarding, no CVA tenderness.  MSK: Moves all four extremities freely.  FOOT EXAM: No ulcers/sores. DP pulse 2+. Sensation to monofilament intact.  SKIN: No significant ulcers, " lesions or rashes on the visualized portions of the skin  NEURO: Alert. Oriented.  PSYCH: Linear thought process. Speech normal rate and volume. No tangential thoughts, hallucinations, or delusions.

## 2024-04-23 ENCOUNTER — PATIENT OUTREACH (OUTPATIENT)
Dept: GASTROENTEROLOGY | Facility: CLINIC | Age: 59
End: 2024-04-23
Payer: COMMERCIAL

## 2024-08-11 ENCOUNTER — HEALTH MAINTENANCE LETTER (OUTPATIENT)
Age: 59
End: 2024-08-11

## 2024-08-15 ENCOUNTER — TRANSFERRED RECORDS (OUTPATIENT)
Dept: MULTI SPECIALTY CLINIC | Facility: CLINIC | Age: 59
End: 2024-08-15

## 2024-08-15 LAB — RETINOPATHY: NORMAL

## 2024-10-03 ENCOUNTER — OFFICE VISIT (OUTPATIENT)
Dept: INTERNAL MEDICINE | Facility: CLINIC | Age: 59
End: 2024-10-03
Attending: INTERNAL MEDICINE
Payer: COMMERCIAL

## 2024-10-03 VITALS
OXYGEN SATURATION: 97 % | HEIGHT: 66 IN | HEART RATE: 83 BPM | DIASTOLIC BLOOD PRESSURE: 68 MMHG | RESPIRATION RATE: 14 BRPM | BODY MASS INDEX: 33.35 KG/M2 | WEIGHT: 207.5 LBS | SYSTOLIC BLOOD PRESSURE: 110 MMHG

## 2024-10-03 DIAGNOSIS — E78.5 HYPERLIPIDEMIA LDL GOAL <100: ICD-10-CM

## 2024-10-03 DIAGNOSIS — E11.65 TYPE 2 DIABETES MELLITUS WITH HYPERGLYCEMIA, WITHOUT LONG-TERM CURRENT USE OF INSULIN (H): Primary | ICD-10-CM

## 2024-10-03 DIAGNOSIS — I10 ESSENTIAL HYPERTENSION: ICD-10-CM

## 2024-10-03 LAB
EST. AVERAGE GLUCOSE BLD GHB EST-MCNC: 123 MG/DL
HBA1C MFR BLD: 5.9 % (ref 0–5.6)

## 2024-10-03 PROCEDURE — 83036 HEMOGLOBIN GLYCOSYLATED A1C: CPT | Performed by: INTERNAL MEDICINE

## 2024-10-03 PROCEDURE — G2211 COMPLEX E/M VISIT ADD ON: HCPCS | Performed by: INTERNAL MEDICINE

## 2024-10-03 PROCEDURE — 36415 COLL VENOUS BLD VENIPUNCTURE: CPT | Performed by: INTERNAL MEDICINE

## 2024-10-03 PROCEDURE — 99214 OFFICE O/P EST MOD 30 MIN: CPT | Performed by: INTERNAL MEDICINE

## 2024-10-03 RX ORDER — ATORVASTATIN CALCIUM 40 MG/1
40 TABLET, FILM COATED ORAL DAILY
Qty: 90 TABLET | Refills: 1 | Status: SHIPPED | OUTPATIENT
Start: 2024-10-03

## 2024-10-03 RX ORDER — LISINOPRIL 20 MG/1
20 TABLET ORAL DAILY
Qty: 90 TABLET | Refills: 1 | Status: SHIPPED | OUTPATIENT
Start: 2024-10-03

## 2024-10-03 NOTE — PATIENT INSTRUCTIONS
- I will send you a message on [employer] when I am able to look at the results of your tests from today    - Make an appointment with our pharmacy downstairs or stop by your preferred pharmacy to discuss obtaining the Shingrix (shingles) vaccine series

## 2024-10-03 NOTE — Clinical Note
Via the Health Maintenance questionnaire, the patient has reported the following services have been completed -Eye Exam: [facility] 2024-08-15, this information has been sent to the abstraction team.

## 2024-10-03 NOTE — PROGRESS NOTES
Assessment & Plan   Type 2 diabetes mellitus with hyperglycemia, without long-term current use of insulin (H)  Off glipizide for 3 months. Re-check A1c today.  - HEMOGLOBIN A1C; Future    Hyperlipidemia LDL goal <100  Reviewed April 2024 lipid panel. Refilled chronic medication at current dose.  - atorvastatin (LIPITOR) 40 MG tablet; Take 1 tablet (40 mg) by mouth daily.    Essential hypertension  BP at goal. Refilled chronic medication at current dose.  - lisinopril (ZESTRIL) 20 MG tablet; Take 1 tablet (20 mg) by mouth daily.    Signed Electronically by:  Gumaro Urena MD, MPH  [facility]  Internal Medicine    The longitudinal plan of care for the diagnosis(es)/condition(s) as documented were addressed during this visit. Due to the added complexity in care, I will continue to support Hill in the subsequent management and with ongoing continuity of care.    Flip Alejandre is a 59 year old presenting for the following health issues: Diabetes    Via the Health Maintenance questionnaire, the patient has reported the following services have been completed -Eye Exam: [facility] 2024-08-15, this information has been sent to the abstraction team.    History of Present Illness       Diabetes:   He presents for follow up of diabetes.  He is checking home blood glucose a few times a month.   He checks blood glucose before and after meals.  Blood glucose is never over 200 and never under 70.  When his blood glucose is low, the patient is asymptomatic for confusion, blurred vision, lethargy and reports not feeling dizzy, shaky, or weak.   He has no concerns regarding his diabetes at this time.   He is not experiencing numbness or burning in feet, excessive thirst, blurry vision, weight changes or redness, sores or blisters on feet. The patient has had a diabetic eye exam in the last 12 months. Eye exam performed on 8/24. Location of last eye exam [facility].          Patient  "stopped glipizide in July 2024. Last A1c was normal. Here for re-check. Needs refills on other medications.        Objective    /68   Pulse 83   Resp 14   Ht 1.676 m (5' 6\")   Wt 94.1 kg (207 lb 8 oz)   SpO2 97%   BMI 33.49 kg/m    Body mass index is 33.49 kg/m .    Physical Exam   GENERAL: alert and in no distress.  EYES: conjunctivae/corneas clear. EOMs grossly intact  HENT: Facies symmetric.  RESP: No iWOB.  MSK: Moves all four extremities freely  SKIN: No significant ulcers, lesions, or rashes on the visualized portions of the skin  NEURO: CN II-XII grossly intact.  "

## 2025-01-26 ENCOUNTER — HEALTH MAINTENANCE LETTER (OUTPATIENT)
Age: 60
End: 2025-01-26

## 2025-04-02 SDOH — HEALTH STABILITY: PHYSICAL HEALTH: ON AVERAGE, HOW MANY MINUTES DO YOU ENGAGE IN EXERCISE AT THIS LEVEL?: 50 MIN

## 2025-04-02 SDOH — HEALTH STABILITY: PHYSICAL HEALTH: ON AVERAGE, HOW MANY DAYS PER WEEK DO YOU ENGAGE IN MODERATE TO STRENUOUS EXERCISE (LIKE A BRISK WALK)?: 6 DAYS

## 2025-04-02 ASSESSMENT — SOCIAL DETERMINANTS OF HEALTH (SDOH): HOW OFTEN DO YOU GET TOGETHER WITH FRIENDS OR RELATIVES?: TWICE A WEEK

## 2025-04-03 ENCOUNTER — OFFICE VISIT (OUTPATIENT)
Dept: INTERNAL MEDICINE | Facility: CLINIC | Age: 60
End: 2025-04-03
Attending: INTERNAL MEDICINE
Payer: COMMERCIAL

## 2025-04-03 VITALS
BODY MASS INDEX: 34.3 KG/M2 | DIASTOLIC BLOOD PRESSURE: 74 MMHG | SYSTOLIC BLOOD PRESSURE: 106 MMHG | HEART RATE: 85 BPM | WEIGHT: 213.4 LBS | HEIGHT: 66 IN | RESPIRATION RATE: 16 BRPM | OXYGEN SATURATION: 96 % | TEMPERATURE: 97.9 F

## 2025-04-03 DIAGNOSIS — I10 ESSENTIAL HYPERTENSION: ICD-10-CM

## 2025-04-03 DIAGNOSIS — E78.5 HYPERLIPIDEMIA LDL GOAL <100: ICD-10-CM

## 2025-04-03 DIAGNOSIS — Z12.5 SCREENING FOR PROSTATE CANCER: ICD-10-CM

## 2025-04-03 DIAGNOSIS — E11.65 TYPE 2 DIABETES MELLITUS WITH HYPERGLYCEMIA, WITHOUT LONG-TERM CURRENT USE OF INSULIN (H): ICD-10-CM

## 2025-04-03 DIAGNOSIS — Z00.00 ROUTINE GENERAL MEDICAL EXAMINATION AT A HEALTH CARE FACILITY: Primary | ICD-10-CM

## 2025-04-03 LAB
ALBUMIN SERPL BCG-MCNC: 4.3 G/DL (ref 3.5–5.2)
ALP SERPL-CCNC: 113 U/L (ref 40–150)
ALT SERPL W P-5'-P-CCNC: 44 U/L (ref 0–70)
ANION GAP SERPL CALCULATED.3IONS-SCNC: 11 MMOL/L (ref 7–15)
AST SERPL W P-5'-P-CCNC: 33 U/L (ref 0–45)
BILIRUB SERPL-MCNC: 0.6 MG/DL
BUN SERPL-MCNC: 13.2 MG/DL (ref 8–23)
CALCIUM SERPL-MCNC: 9.3 MG/DL (ref 8.8–10.4)
CHLORIDE SERPL-SCNC: 104 MMOL/L (ref 98–107)
CHOLEST SERPL-MCNC: 142 MG/DL
CREAT SERPL-MCNC: 1.04 MG/DL (ref 0.67–1.17)
CREAT UR-MCNC: 222 MG/DL
EGFRCR SERPLBLD CKD-EPI 2021: 82 ML/MIN/1.73M2
ERYTHROCYTE [DISTWIDTH] IN BLOOD BY AUTOMATED COUNT: 12.7 % (ref 10–15)
EST. AVERAGE GLUCOSE BLD GHB EST-MCNC: 137 MG/DL
FASTING STATUS PATIENT QL REPORTED: YES
FASTING STATUS PATIENT QL REPORTED: YES
GLUCOSE SERPL-MCNC: 131 MG/DL (ref 70–99)
HBA1C MFR BLD: 6.4 % (ref 0–5.6)
HCO3 SERPL-SCNC: 25 MMOL/L (ref 22–29)
HCT VFR BLD AUTO: 45.9 % (ref 40–53)
HDLC SERPL-MCNC: 42 MG/DL
HGB BLD-MCNC: 15.8 G/DL (ref 13.3–17.7)
LDLC SERPL CALC-MCNC: 83 MG/DL
MCH RBC QN AUTO: 31.1 PG (ref 26.5–33)
MCHC RBC AUTO-ENTMCNC: 34.4 G/DL (ref 31.5–36.5)
MCV RBC AUTO: 90 FL (ref 78–100)
MICROALBUMIN UR-MCNC: <12 MG/L
MICROALBUMIN/CREAT UR: NORMAL MG/G{CREAT}
NONHDLC SERPL-MCNC: 100 MG/DL
PLATELET # BLD AUTO: 298 10E3/UL (ref 150–450)
POTASSIUM SERPL-SCNC: 4.5 MMOL/L (ref 3.4–5.3)
PROT SERPL-MCNC: 6.8 G/DL (ref 6.4–8.3)
PSA SERPL DL<=0.01 NG/ML-MCNC: 1.27 NG/ML (ref 0–4.5)
RBC # BLD AUTO: 5.08 10E6/UL (ref 4.4–5.9)
SODIUM SERPL-SCNC: 140 MMOL/L (ref 135–145)
TRIGL SERPL-MCNC: 86 MG/DL
WBC # BLD AUTO: 7.2 10E3/UL (ref 4–11)

## 2025-04-03 RX ORDER — LISINOPRIL 20 MG/1
20 TABLET ORAL DAILY
Qty: 90 TABLET | Refills: 4 | Status: SHIPPED | OUTPATIENT
Start: 2025-04-03

## 2025-04-03 RX ORDER — ATORVASTATIN CALCIUM 40 MG/1
40 TABLET, FILM COATED ORAL DAILY
Qty: 90 TABLET | Refills: 4 | Status: SHIPPED | OUTPATIENT
Start: 2025-04-03

## 2025-04-03 NOTE — PROGRESS NOTES
"Preventive Care Visit  [facility]  Gumaro Urena MD, Internal Medicine  Apr 3, 2025    Assessment & Plan   Routine general medical examination at a health care facility  Reviewed PMH. Discussed healthcare maintenance issues, including cancer screenings, relevant immunizations (encouraged patient obtain Shingrix through a pharmacy in the [place]), and cardiac risk factor screenings such as for cholesterol, HTN, and DM.    Type 2 diabetes mellitus with hyperglycemia, without long-term current use of insulin (H)  Controlled through diet and exercise since he stopped glipizide (at my direction) last year. Will update labs today.  - CBC with platelets; Future  - Comprehensive metabolic panel; Future  - Hemoglobin A1c; Future  - Albumin Random Urine Quantitative with Creat Ratio; Future  - FOOT EXAM  NO CHARGE [[id number]]    Essential hypertension  BP at goal today. Refilled chronic medication at current dose.  - lisinopril (ZESTRIL) 20 MG tablet; Take 1 tablet (20 mg) by mouth daily.    Hyperlipidemia LDL goal <100  Fasting lab check today. Refilled chronic medication at current dose.  - Lipid panel reflex to direct LDL Fasting; Future  - atorvastatin (LIPITOR) 40 MG tablet; Take 1 tablet (40 mg) by mouth daily.    Screening for prostate cancer  PSA WNL in past.  - Prostate Specific Antigen Screen; Future    BMI  Estimated body mass index is 34.44 kg/m  as calculated from the following:    Height as of this encounter: 1.676 m (5' 6\").    Weight as of this encounter: 96.8 kg (213 lb 6.4 oz).     Counseling  Appropriate preventive services were addressed with this patient via screening, questionnaire, or discussion as appropriate for fall prevention, nutrition, physical activity, Tobacco-use cessation, social engagement, weight loss and cognition. Checklist reviewing preventive services available has been given to the patient. Reviewed patient's diet, addressing concerns and/or questions.     Signed " Electronically by:  Gumaro Urena MD, MPH  [facility]  Internal Medicine    The longitudinal plan of care for the diagnosis(es)/condition(s) as documented were addressed during this visit. Due to the added complexity in care, I will continue to support Hill in the subsequent management and with ongoing continuity of care.    Flip Alejandre is a 59 year old presenting for the following: Physical    HPI  Hill presents today for a physical exam. We also discussed his chronic health conditions. He has been trying to exercise more. He likes to walk around [place].    Advance Care Planning Patient does not have a Health Care Directive        4/2/2025   General Health   How would you rate your overall physical health? Good   Feel stress (tense, anxious, or unable to sleep) Only a little   (!) STRESS CONCERN      4/2/2025   Nutrition   Three or more servings of calcium each day? Yes   Diet: Regular (no restrictions)   How many servings of fruit and vegetables per day? (!) 2-3   How many sweetened beverages each day? 0-1         4/2/2025   Exercise   Days per week of moderate/strenous exercise 6 days   Average minutes spent exercising at this level 50 min         4/2/2025   Social Factors   Frequency of gathering with friends or relatives Twice a week   Worry food won't last until get money to buy more No   Food not last or not have enough money for food? No   Do you have housing? (Housing is defined as stable permanent housing and does not include staying ouside in a car, in a tent, in an abandoned building, in an overnight shelter, or couch-surfing.) Yes   Are you worried about losing your housing? No   Lack of transportation? No   Unable to get utilities (heat,electricity)? No         4/2/2025   Fall Risk   Fallen 2 or more times in the past year? No   Trouble with walking or balance? No          4/2/2025   Dental   Dentist two times every year? Yes         4/17/2024   TB  "Screening   Were you born outside of the [place]? No     Today's PHQ-2 Score:       4/2/2025     8:54 AM   PHQ-2 ( 1999 [employer])   Q1: Little interest or pleasure in doing things 0   Q2: Feeling down, depressed or hopeless 0   PHQ-2 Score 0    Q1: Little interest or pleasure in doing things Not at all   Q2: Feeling down, depressed or hopeless Not at all   PHQ-2 Score 0       Patient-reported         4/2/2025   Substance Use   Alcohol more than 3/day or more than 7/wk No   Do you use any other substances recreationally? No     Social History     Tobacco Use    Smoking status: Former     Current packs/day: 0.25     Average packs/day: 0.3 packs/day for 20.0 years (5.0 ttl pk-yrs)     Types: Cigarettes    Smokeless tobacco: Current     Types: Chew    Tobacco comments:     1 tin every 2 weeks   Vaping Use    Vaping status: Some Days   Substance Use Topics    Alcohol use: Yes     Comment: 1-2 drinks twice weekly    Drug use: No         4/2/2025   STI Screening   New sexual partner(s) since last STI/HIV test? No   Last PSA:   PSA   Date Value Ref Range Status   09/02/2020 1.21 0 - 4 ug/L Final     Comment:     Assay Method:  Chemiluminescence using Siemens Vista analyzer     Prostate Specific Antigen Screen   Date Value Ref Range Status   10/17/2023 0.93 0.00 - 3.50 ng/mL Final     ASCVD Risk   The 10-year ASCVD risk score (Ashutosh DOMINGUEZ, et al., 2019) is: 10.8%    Values used to calculate the score:      Age: 59 years      Sex: Male      Is Non- : No      Diabetic: Yes      Tobacco smoker: No      Systolic Blood Pressure: 106 mmHg      Is BP treated: Yes      HDL Cholesterol: 41 mg/dL      Total Cholesterol: 133 mg/dL    Reviewed and updated as needed this visit by Provider   Tobacco  Allergies  Meds  Problems  Med Hx  Surg Hx  Fam Hx             Objective    Exam  /74   Pulse 85   Temp 97.9  F (36.6  C) (Tympanic)   Resp 16   Ht 1.676 m (5' 6\")   Wt 96.8 kg (213 lb 6.4 oz)   SpO2 " "96%   BMI 34.44 kg/m     Estimated body mass index is 34.44 kg/m  as calculated from the following:    Height as of this encounter: 1.676 m (5' 6\").    Weight as of this encounter: 96.8 kg (213 lb 6.4 oz).    Physical Exam  GENERAL: In no distress.  EYES: Conjunctivae/corneas clear. EOMs grossly intact.  HENT: NC/AT, facies symmetric. Neck supple. No LAD or thyromegaly noted.  RESP: CTAB. No w/r/r.  CV: RRR, no m/r/g.  GI: NT, ND, without rebound or guarding, no CVA tenderness, no hepatomegaly appreciated.  MSK: Moves all four extremities freely.  SKIN: No significant ulcers, lesions or rashes on the visualized portions of the skin  FOOT EXAM: No ulcers/sores. DP pulse 2+. Sensation to monofilament intact.  NEURO: Alert. Oriented.  PSYCH: Linear thought process. Speech normal rate and volume. No tangential thoughts, hallucinations, or delusions.  "

## 2025-04-03 NOTE — PATIENT INSTRUCTIONS
- I will send you a message on [employer] when I am able to look at the results of your tests from today    - Make an appointment with our pharmacy downstairs or stop by your preferred pharmacy to discuss obtaining the Shingrix (shingles) vaccine series

## 2025-07-05 ENCOUNTER — HEALTH MAINTENANCE LETTER (OUTPATIENT)
Age: 60
End: 2025-07-05